# Patient Record
Sex: MALE | Race: BLACK OR AFRICAN AMERICAN | NOT HISPANIC OR LATINO | URBAN - METROPOLITAN AREA
[De-identification: names, ages, dates, MRNs, and addresses within clinical notes are randomized per-mention and may not be internally consistent; named-entity substitution may affect disease eponyms.]

---

## 2018-03-01 ENCOUNTER — OUTPATIENT (OUTPATIENT)
Dept: OUTPATIENT SERVICES | Facility: HOSPITAL | Age: 53
LOS: 1 days | Discharge: HOME | End: 2018-03-01
Payer: COMMERCIAL

## 2018-03-01 DIAGNOSIS — Z13.6 ENCOUNTER FOR SCREENING FOR CARDIOVASCULAR DISORDERS: ICD-10-CM

## 2018-03-01 PROCEDURE — 93010 ELECTROCARDIOGRAM REPORT: CPT

## 2019-01-29 ENCOUNTER — APPOINTMENT (OUTPATIENT)
Dept: INFECTIOUS DISEASE | Facility: CLINIC | Age: 54
End: 2019-01-29

## 2019-01-29 ENCOUNTER — APPOINTMENT (OUTPATIENT)
Dept: INFECTIOUS DISEASE | Facility: CLINIC | Age: 54
End: 2019-01-29
Payer: COMMERCIAL

## 2019-01-29 ENCOUNTER — LABORATORY RESULT (OUTPATIENT)
Age: 54
End: 2019-01-29

## 2019-01-29 VITALS
TEMPERATURE: 97.8 F | SYSTOLIC BLOOD PRESSURE: 156 MMHG | WEIGHT: 160 LBS | HEART RATE: 67 BPM | DIASTOLIC BLOOD PRESSURE: 81 MMHG | OXYGEN SATURATION: 100 %

## 2019-01-29 PROCEDURE — 99204 OFFICE O/P NEW MOD 45 MIN: CPT

## 2019-01-29 RX ORDER — BRIMONIDINE TARTRATE 1.5 MG/ML
0.15 SOLUTION/ DROPS OPHTHALMIC
Refills: 0 | Status: ACTIVE | COMMUNITY

## 2019-01-29 RX ORDER — DORZOLAMIDE HYDROCHLORIDE 20 MG/ML
2 SOLUTION OPHTHALMIC
Refills: 0 | Status: ACTIVE | COMMUNITY

## 2019-01-29 RX ORDER — LATANOPROST/PF 0.005 %
DROPS OPHTHALMIC (EYE)
Refills: 0 | Status: ACTIVE | COMMUNITY

## 2019-01-29 NOTE — PHYSICAL EXAM
[General Appearance - Alert] : alert [General Appearance - In No Acute Distress] : in no acute distress [Sclera] : the sclera and conjunctiva were normal [PERRL With Normal Accommodation] : pupils were equal in size, round, reactive to light [Extraocular Movements] : extraocular movements were intact [Outer Ear] : the ears and nose were normal in appearance [Oropharynx] : the oropharynx was normal with no thrush [Neck Appearance] : the appearance of the neck was normal [Neck Cervical Mass (___cm)] : no neck mass was observed [Jugular Venous Distention Increased] : there was no jugular-venous distention [Thyroid Diffuse Enlargement] : the thyroid was not enlarged [Auscultation Breath Sounds / Voice Sounds] : lungs were clear to auscultation bilaterally [Heart Rate And Rhythm] : heart rate was normal and rhythm regular [Heart Sounds] : normal S1 and S2 [Heart Sounds Gallop] : no gallops [Murmurs] : no murmurs [Heart Sounds Pericardial Friction Rub] : no pericardial rub [Full Pulse] : the pedal pulses are present [Edema] : there was no peripheral edema [Bowel Sounds] : normal bowel sounds [Abdomen Soft] : soft [Abdomen Tenderness] : non-tender [Abdomen Mass (___ Cm)] : no abdominal mass palpated [Costovertebral Angle Tenderness] : no CVA tenderness [Penis Abnormality] : the penis was normal [Scrotum] : the scrotum was normal [Testes Swelling] : the testicles were not swollen [Testes Mass (___cm)] : there were no testicular masses [No Palpable Adenopathy] : no palpable adenopathy [Musculoskeletal - Swelling] : no joint swelling [Nail Clubbing] : no clubbing  or cyanosis of the fingernails [Motor Tone] : muscle strength and tone were normal [Skin Color & Pigmentation] : normal skin color and pigmentation [] : no rash [Deep Tendon Reflexes (DTR)] : deep tendon reflexes were 2+ and symmetric [Sensation] : the sensory exam was normal to light touch and pinprick [No Focal Deficits] : no focal deficits [Oriented To Time, Place, And Person] : oriented to person, place, and time [Affect] : the affect was normal

## 2019-01-29 NOTE — HISTORY OF PRESENT ILLNESS
[FreeTextEntry1] : 53 year old male. Diagnosed with HIV in 1993, from female. Pt was was giving blood at blood [employer]..then went to specialist and told HIV. Then went to see Dr Nohemy Roberts MD internal medicine in [facility] [phone]..she is now retiring? or leaving practice  . He has seen her since 1993 to 2019. PMH: HIV, glaucoma. 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 \par Presently taking- stavudine 40mg  BID, norvir 4 pills BID, Crixivan 400mg 2 tabs BID, nevirapine 200mg  1 pill bid.  for glaucoma, alphagan  daily, dopzolamide HCL daytime, latanoprost night, amoxicillin for dental. \par Parents-father- passed from possible prostate. mother alive- thyroid.\par Pt drink a few beers socially.\par Sleeps only with females. has partner. not . Has one 23 year old male\par Pt works- receiving \par Plan- all labs today\par see in  3 weeks. waiting for genosure archive. \par Pt cell number [phone]\par pt needs colonoscopy. Never had one. \par needs to see rheumatolgy- referral today. \par \par  [Women] : with women [de-identified] : not sure.  [de-identified] : no [de-identified] : receiving  [de-identified] : female [de-identified] : his mother.  [de-identified] : no one knows.

## 2019-01-29 NOTE — ASSESSMENT
[FreeTextEntry1] : 53 year old male. Diagnosed with HIV in 1993, from female. Pt was was giving blood at blood [email or web address]..then went to specialist and told HIV. Then went to see Dr Nohemy Roberts MD internal medicine in [facility] [phone]..she is now retiring? or leaving practice  . He has seen her since 1993 to 2019. PMH: HIV, glaucoma. 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 \par Presently taking- stavudine 40mg  BID, norvir 4 pills BID, Crixivan 400mg 2 tabs BID, nevirapine 200mg  1 pill bid.  for glaucoma, alphagan  daily, dopzolamide HCL daytime, latanoprost night, amoxicillin for dental. \par Parents-father- passed from possible prostate. mother alive- thyroid.\par Pt drink a few beers socially.\par uses [facility] in [place] [facility], on [facility] [address].  [Treatment Education] : treatment education [Treatment Adherence] : treatment adherence [Drug Interactions / Side Effects] : drug interactions/side effects [Disclosure of Diagnosis] : disclosure of diagnosis [HIV Education] : HIV Education [Anticipatory Guidance] : anticipatory guidance

## 2019-01-31 LAB
25(OH)D3 SERPL-MCNC: 24 NG/ML
ALBUMIN SERPL ELPH-MCNC: 4.3 G/DL
ALP BLD-CCNC: 73 U/L
ALT SERPL-CCNC: 15 U/L
ANION GAP SERPL CALC-SCNC: 12 MMOL/L
APPEARANCE: CLEAR
AST SERPL-CCNC: 28 U/L
BACTERIA: NEGATIVE
BASOPHILS # BLD AUTO: 0.04 K/UL
BASOPHILS NFR BLD AUTO: 0.6 %
BILIRUB SERPL-MCNC: 1.8 MG/DL
BILIRUBIN URINE: NEGATIVE
BLOOD URINE: NEGATIVE
BUN SERPL-MCNC: 9 MG/DL
C TRACH RRNA SPEC QL NAA+PROBE: NOT DETECTED
CALCIUM SERPL-MCNC: 9.9 MG/DL
CD3 CELLS # BLD: 1841 /UL
CD3 CELLS NFR BLD: 69 %
CD3+CD4+ CELLS # BLD: 1267 /UL
CD3+CD4+ CELLS NFR BLD: 47 %
CD3+CD4+ CELLS/CD3+CD8+ CLL SPEC: 2.44 RATIO
CD3+CD8+ CELLS # SPEC: 520 /UL
CD3+CD8+ CELLS NFR BLD: 19 %
CHLORIDE SERPL-SCNC: 105 MMOL/L
CHOLEST SERPL-MCNC: 219 MG/DL
CHOLEST/HDLC SERPL: 4 RATIO
CO2 SERPL-SCNC: 22 MMOL/L
COLOR: YELLOW
CREAT SERPL-MCNC: 0.85 MG/DL
EOSINOPHIL # BLD AUTO: 0.21 K/UL
EOSINOPHIL NFR BLD AUTO: 3.4 %
GLUCOSE QUALITATIVE U: NEGATIVE MG/DL
GLUCOSE SERPL-MCNC: 82 MG/DL
HBA1C MFR BLD HPLC: 5.2 %
HBV CORE IGG+IGM SER QL: NONREACTIVE
HBV SURFACE AB SER QL: REACTIVE
HBV SURFACE AG SER QL: NONREACTIVE
HCT VFR BLD CALC: 42.7 %
HCV AB SER QL: NONREACTIVE
HCV S/CO RATIO: 0.15 S/CO
HDLC SERPL-MCNC: 55 MG/DL
HGB BLD-MCNC: 14.5 G/DL
HIV1 RNA # SERPL NAA+PROBE: NORMAL
HIV1 RNA # SERPL NAA+PROBE: NORMAL COPIES/ML
HIV1+2 AB SPEC QL IA.RAPID: REACTIVE
IMM GRANULOCYTES NFR BLD AUTO: 0.2 %
KETONES URINE: NEGATIVE
LDLC SERPL CALC-MCNC: 130 MG/DL
LEUKOCYTE ESTERASE URINE: NEGATIVE
LYMPHOCYTES # BLD AUTO: 3.08 K/UL
LYMPHOCYTES NFR BLD AUTO: 49.5 %
MAN DIFF?: NORMAL
MCHC RBC-ENTMCNC: 34 GM/DL
MCHC RBC-ENTMCNC: 34.2 PG
MCV RBC AUTO: 100.7 FL
MICROSCOPIC-UA: NORMAL
MONOCYTES # BLD AUTO: 0.61 K/UL
MONOCYTES NFR BLD AUTO: 9.8 %
N GONORRHOEA RRNA SPEC QL NAA+PROBE: NOT DETECTED
NEUTROPHILS # BLD AUTO: 2.27 K/UL
NEUTROPHILS NFR BLD AUTO: 36.5 %
NITRITE URINE: NEGATIVE
PH URINE: 6
PLATELET # BLD AUTO: 179 K/UL
POTASSIUM SERPL-SCNC: 4 MMOL/L
PROT SERPL-MCNC: 8.1 G/DL
PROTEIN URINE: ABNORMAL MG/DL
RBC # BLD: 4.24 M/UL
RBC # FLD: 15.1 %
RED BLOOD CELLS URINE: 6 /HPF
SODIUM SERPL-SCNC: 139 MMOL/L
SOURCE AMPLIFICATION: NORMAL
SPECIFIC GRAVITY URINE: 1.02
SQUAMOUS EPITHELIAL CELLS: 1 /HPF
T PALLIDUM AB SER QL IA: NEGATIVE
TRIGL SERPL-MCNC: 172 MG/DL
UROBILINOGEN URINE: NEGATIVE MG/DL
VIRAL LOAD INTERP: NORMAL
VIRAL LOAD LOG: NORMAL LG COP/ML
WBC # FLD AUTO: 6.22 K/UL
WHITE BLOOD CELLS URINE: 1 /HPF

## 2019-02-01 LAB
M TB IFN-G BLD-IMP: NEGATIVE
QUANTIFERON TB PLUS MITOGEN MINUS NIL: 6.64 IU/ML
QUANTIFERON TB PLUS NIL: 0.01 IU/ML
QUANTIFERON TB PLUS TB1 MINUS NIL: 0 IU/ML
QUANTIFERON TB PLUS TB2 MINUS NIL: 0.01 IU/ML

## 2019-02-11 LAB
HIV GENOSURE ARCHIVE 1: NORMAL
HIV1 PROVIR DNA RT + PR + IN MUT DET SEQ: NORMAL
HIV1 PROVIRAL DNA GENTYP BLD MC NAR: NORMAL

## 2019-02-20 ENCOUNTER — APPOINTMENT (OUTPATIENT)
Dept: INFECTIOUS DISEASE | Facility: CLINIC | Age: 54
End: 2019-02-20
Payer: COMMERCIAL

## 2019-02-20 VITALS
OXYGEN SATURATION: 100 % | WEIGHT: 157 LBS | TEMPERATURE: 97.1 F | SYSTOLIC BLOOD PRESSURE: 146 MMHG | DIASTOLIC BLOOD PRESSURE: 80 MMHG | HEART RATE: 76 BPM

## 2019-02-20 PROCEDURE — 99214 OFFICE O/P EST MOD 30 MIN: CPT

## 2019-02-20 NOTE — ASSESSMENT
[FreeTextEntry1] : 2/20/2019----BRENDA LEE is a 53 year old male being seen for a second  evaluation of an existing diagnosis. Plan to change his 14 pill regimen to one pill. Start Biktarvy today. see in 3 weeks. [Treatment Education] : treatment education [Treatment Adherence] : treatment adherence [Drug Interactions / Side Effects] : drug interactions/side effects [HIV Education] : HIV Education [Anticipatory Guidance] : anticipatory guidance

## 2019-02-20 NOTE — HISTORY OF PRESENT ILLNESS
[FreeTextEntry1] : Reason For Visit\par 2/20/2019----BRENDA LEE is a 53 year old male being seen for a second  evaluation of an existing diagnosis. Plan to change his 14 pill regimen to one pill. Start Biktarvy today. see in 3 weeks.\par  \par History of Present Illness\par 53 year old male. Diagnosed with HIV in 1993, from female. Pt was was giving blood at blood [employer]..then went to specialist and told HIV. Then went to see Dr Nohemy Roberts MD internal medicine in [facility] [phone]..she is now retiring? or leaving practice . He has seen her since 1993 to 2019. PMH: HIV, glaucoma. 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 They did not say it was AVN. \par Presently taking- stavudine 40mg BID, norvir 4 pills BID, Crixivan 400mg 2 tabs BID, nevirapine 200mg 1 pill bid. for glaucoma, alphagan daily, dopzolamide HCL daytime, latanoprost night, amoxicillin for dental. \par Parents-father- passed from possible prostate. mother alive- thyroid.\par Pt drink a few beers socially.\par Sleeps only with females. has partner. not . Has one 23 year old male\par Pt works- receiving \par Plan- all labs today\par see in 3 weeks. waiting for genosure archive. \par Pt cell number [phone]\par pt needs colonoscopy. Never had one. \par needs to see rheumatolgy- referral today. \par \par \par The patient has intercourse with women. \par STI, Dates, Treatment: not sure. \par Travel: no. \par Occupation: receiving. \par Partner Status: female. \par Lives with his mother. \par Who is aware of HIV status: no one knows. \par

## 2019-03-31 ENCOUNTER — FORM ENCOUNTER (OUTPATIENT)
Age: 54
End: 2019-03-31

## 2019-04-01 ENCOUNTER — OUTPATIENT (OUTPATIENT)
Dept: OUTPATIENT SERVICES | Facility: HOSPITAL | Age: 54
LOS: 1 days | End: 2019-04-01
Payer: COMMERCIAL

## 2019-04-01 ENCOUNTER — APPOINTMENT (OUTPATIENT)
Dept: RADIOLOGY | Facility: CLINIC | Age: 54
End: 2019-04-01
Payer: COMMERCIAL

## 2019-04-01 ENCOUNTER — APPOINTMENT (OUTPATIENT)
Dept: RHEUMATOLOGY | Facility: CLINIC | Age: 54
End: 2019-04-01
Payer: COMMERCIAL

## 2019-04-01 VITALS
OXYGEN SATURATION: 98 % | BODY MASS INDEX: 27.83 KG/M2 | TEMPERATURE: 98.9 F | HEIGHT: 64 IN | WEIGHT: 163 LBS | HEART RATE: 89 BPM | DIASTOLIC BLOOD PRESSURE: 92 MMHG | SYSTOLIC BLOOD PRESSURE: 174 MMHG

## 2019-04-01 DIAGNOSIS — M06.9 RHEUMATOID ARTHRITIS, UNSPECIFIED: ICD-10-CM

## 2019-04-01 PROCEDURE — 73110 X-RAY EXAM OF WRIST: CPT | Mod: 26,50

## 2019-04-01 PROCEDURE — 73610 X-RAY EXAM OF ANKLE: CPT | Mod: 26,50

## 2019-04-01 PROCEDURE — 99244 OFF/OP CNSLTJ NEW/EST MOD 40: CPT

## 2019-04-01 PROCEDURE — 73110 X-RAY EXAM OF WRIST: CPT

## 2019-04-01 PROCEDURE — 73610 X-RAY EXAM OF ANKLE: CPT

## 2019-04-01 PROCEDURE — 73630 X-RAY EXAM OF FOOT: CPT | Mod: 26,50

## 2019-04-01 PROCEDURE — 73130 X-RAY EXAM OF HAND: CPT | Mod: 26,50

## 2019-04-01 PROCEDURE — 73630 X-RAY EXAM OF FOOT: CPT

## 2019-04-01 PROCEDURE — 73130 X-RAY EXAM OF HAND: CPT

## 2019-04-04 ENCOUNTER — APPOINTMENT (OUTPATIENT)
Dept: INFECTIOUS DISEASE | Facility: CLINIC | Age: 54
End: 2019-04-04
Payer: COMMERCIAL

## 2019-04-04 VITALS
OXYGEN SATURATION: 95 % | DIASTOLIC BLOOD PRESSURE: 88 MMHG | HEART RATE: 83 BPM | SYSTOLIC BLOOD PRESSURE: 159 MMHG | TEMPERATURE: 98.4 F | WEIGHT: 163 LBS | BODY MASS INDEX: 27.98 KG/M2

## 2019-04-04 LAB
25(OH)D3 SERPL-MCNC: 21.3 NG/ML
ALBUMIN SERPL ELPH-MCNC: 4.3 G/DL
ALBUMIN SERPL ELPH-MCNC: 4.3 G/DL
ALP BLD-CCNC: 87 U/L
ALP BLD-CCNC: 90 U/L
ALT SERPL-CCNC: 16 U/L
ALT SERPL-CCNC: 21 U/L
ANION GAP SERPL CALC-SCNC: 12 MMOL/L
ANION GAP SERPL CALC-SCNC: 13 MMOL/L
AST SERPL-CCNC: 23 U/L
AST SERPL-CCNC: 25 U/L
BASOPHILS # BLD AUTO: 0.03 K/UL
BASOPHILS NFR BLD AUTO: 0.4 %
BILIRUB SERPL-MCNC: 0.4 MG/DL
BILIRUB SERPL-MCNC: 0.7 MG/DL
BUN SERPL-MCNC: 10 MG/DL
BUN SERPL-MCNC: 16 MG/DL
CALCIUM SERPL-MCNC: 9.7 MG/DL
CALCIUM SERPL-MCNC: 9.9 MG/DL
CCP AB SER IA-ACNC: 52 UNITS
CD3 CELLS # BLD: 1618 /UL
CD3 CELLS NFR BLD: 65 %
CD3+CD4+ CELLS # BLD: 1023 /UL
CD3+CD4+ CELLS NFR BLD: 41 %
CD3+CD4+ CELLS/CD3+CD8+ CLL SPEC: 1.8 RATIO
CD3+CD8+ CELLS # SPEC: 569 /UL
CD3+CD8+ CELLS NFR BLD: 23 %
CHLORIDE SERPL-SCNC: 103 MMOL/L
CHLORIDE SERPL-SCNC: 104 MMOL/L
CO2 SERPL-SCNC: 24 MMOL/L
CO2 SERPL-SCNC: 25 MMOL/L
CREAT SERPL-MCNC: 0.84 MG/DL
CREAT SERPL-MCNC: 0.94 MG/DL
CRP SERPL-MCNC: 0.47 MG/DL
EOSINOPHIL # BLD AUTO: 0.28 K/UL
EOSINOPHIL NFR BLD AUTO: 3.7 %
ERYTHROCYTE [SEDIMENTATION RATE] IN BLOOD BY WESTERGREN METHOD: 71 MM/HR
GLUCOSE SERPL-MCNC: 103 MG/DL
GLUCOSE SERPL-MCNC: 128 MG/DL
HCT VFR BLD CALC: 45.5 %
HGB BLD-MCNC: 15.1 G/DL
IMM GRANULOCYTES NFR BLD AUTO: 0.1 %
LYMPHOCYTES # BLD AUTO: 2.91 K/UL
LYMPHOCYTES NFR BLD AUTO: 37.9 %
MAN DIFF?: NORMAL
MCHC RBC-ENTMCNC: 30.6 PG
MCHC RBC-ENTMCNC: 33.2 GM/DL
MCV RBC AUTO: 92.1 FL
MONOCYTES # BLD AUTO: 0.68 K/UL
MONOCYTES NFR BLD AUTO: 8.9 %
NEUTROPHILS # BLD AUTO: 3.76 K/UL
NEUTROPHILS NFR BLD AUTO: 49 %
PLATELET # BLD AUTO: 222 K/UL
POTASSIUM SERPL-SCNC: 3.6 MMOL/L
POTASSIUM SERPL-SCNC: 4 MMOL/L
PROT SERPL-MCNC: 7.7 G/DL
PROT SERPL-MCNC: 7.8 G/DL
RBC # BLD: 4.94 M/UL
RBC # FLD: 12.6 %
RF+CCP IGG SER-IMP: ABNORMAL
RHEUMATOID FACT SER QL: >650 IU/ML
SODIUM SERPL-SCNC: 140 MMOL/L
SODIUM SERPL-SCNC: 141 MMOL/L
TSH SERPL-ACNC: 1.05 UIU/ML
WBC # FLD AUTO: 7.67 K/UL

## 2019-04-04 PROCEDURE — 99214 OFFICE O/P EST MOD 30 MIN: CPT

## 2019-04-04 NOTE — ASSESSMENT
[FreeTextEntry1] : 4/4/2019----BRENDA LEE is a 53 year old male being seen for a second evaluation of an existing diagnosis. we changed his 14 pill regimen to one pill,  Biktarvy today. see in 3 months. [Treatment Education] : treatment education [Treatment Adherence] : treatment adherence [Drug Interactions / Side Effects] : drug interactions/side effects [HIV Education] : HIV Education [Anticipatory Guidance] : anticipatory guidance

## 2019-04-04 NOTE — HISTORY OF PRESENT ILLNESS
[FreeTextEntry1] : \par History of Present Illness\par Reason For Visit\par 4/4/2019----BRENDA LEE is a 53 year old male being seen for a second evaluation of an existing diagnosis. we changed his 14 pill regimen to one pill,  Biktarvy today. see in 3 months.\par  \par History of Present Illness\par 53 year old male. Diagnosed with HIV in 1993, from female. Pt was was giving blood at blood [employer]..then went to specialist and told HIV. Then went to see Dr Nohemy Roberts MD internal medicine in [facility] [phone]..she is now retiring? or leaving practice . He has seen her since 1993 to 2019. PMH: HIV, glaucoma. 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 They did not say it was AVN. \par Presently taking- stavudine 40mg BID, norvir 4 pills BID, Crixivan 400mg 2 tabs BID, nevirapine 200mg 1 pill bid. for glaucoma, alphagan daily, dopzolamide HCL daytime, latanoprost night, amoxicillin for dental. \par Parents-father- passed from possible prostate. mother alive- thyroid.\par Pt drink a few beers socially.\par Sleeps only with females. has partner. not . Has one 23 year old male\par Pt works- receiving \par Plan- all labs today\par see in 3 weeks. waiting for genosure archive. \par Pt cell number [phone]\par pt needs colonoscopy. Never had one. \par needs to see rheumatolgy- referral today. \par \par \par The patient has intercourse with women. \par STI, Dates, Treatment: not sure. \par Travel: no. \par Occupation: receiving. \par Partner Status: female. \par Lives with his mother. \par Who is aware of HIV status: no one knows. \par \par  \par Active Problems\par HIV disease (042) (B20)\par Rheumatoid arthritis (714.0) (M06.9)\par \par Past Medical History\par History of dental examination (V15.89) (Z92.89)\par History of Visit for eye and vision exam (V72.0) (Z01.00)\par \par Current Meds\par Biktarvy\par Alphagan P 0.15 % Ophthalmic Solution\par Amoxicillin 500 MG Oral Capsule\par Dorzolamide HCl - 2 % Ophthalmic Solution\par Latanoprost SOLN\par \par

## 2019-04-05 LAB
25(OH)D3 SERPL-MCNC: 22.5 NG/ML
APPEARANCE: CLEAR
BACTERIA: NEGATIVE
BASOPHILS # BLD AUTO: 0.04 K/UL
BASOPHILS NFR BLD AUTO: 0.6 %
BILIRUBIN URINE: NEGATIVE
BLOOD URINE: NEGATIVE
COLOR: NORMAL
EOSINOPHIL # BLD AUTO: 0.25 K/UL
EOSINOPHIL NFR BLD AUTO: 3.6 %
GLUCOSE QUALITATIVE U: NEGATIVE
HCT VFR BLD CALC: 46 %
HGB BLD-MCNC: 15.1 G/DL
HIV1 RNA # SERPL NAA+PROBE: NORMAL
HIV1 RNA # SERPL NAA+PROBE: NORMAL COPIES/ML
HYALINE CASTS: 0 /LPF
IMM GRANULOCYTES NFR BLD AUTO: 0.3 %
KETONES URINE: NEGATIVE
LEUKOCYTE ESTERASE URINE: NEGATIVE
LYMPHOCYTES # BLD AUTO: 2.72 K/UL
LYMPHOCYTES NFR BLD AUTO: 38.7 %
MAN DIFF?: NORMAL
MCHC RBC-ENTMCNC: 30.8 PG
MCHC RBC-ENTMCNC: 32.8 GM/DL
MCV RBC AUTO: 93.7 FL
MICROSCOPIC-UA: NORMAL
MONOCYTES # BLD AUTO: 0.59 K/UL
MONOCYTES NFR BLD AUTO: 8.4 %
NEUTROPHILS # BLD AUTO: 3.4 K/UL
NEUTROPHILS NFR BLD AUTO: 48.4 %
NITRITE URINE: NEGATIVE
PH URINE: 6
PLATELET # BLD AUTO: 209 K/UL
PROTEIN URINE: NEGATIVE
RBC # BLD: 4.91 M/UL
RBC # FLD: 12.6 %
RED BLOOD CELLS URINE: 1 /HPF
SPECIFIC GRAVITY URINE: 1.02
SQUAMOUS EPITHELIAL CELLS: 0 /HPF
UROBILINOGEN URINE: NORMAL
VIRAL LOAD INTERP: NORMAL
VIRAL LOAD LOG: NORMAL LG COP/ML
WBC # FLD AUTO: 7.02 K/UL
WHITE BLOOD CELLS URINE: 1 /HPF

## 2019-07-25 ENCOUNTER — APPOINTMENT (OUTPATIENT)
Dept: INFECTIOUS DISEASE | Facility: CLINIC | Age: 54
End: 2019-07-25
Payer: COMMERCIAL

## 2019-07-25 ENCOUNTER — APPOINTMENT (OUTPATIENT)
Dept: RHEUMATOLOGY | Facility: CLINIC | Age: 54
End: 2019-07-25
Payer: COMMERCIAL

## 2019-07-25 VITALS
TEMPERATURE: 97.5 F | BODY MASS INDEX: 31.36 KG/M2 | HEART RATE: 81 BPM | DIASTOLIC BLOOD PRESSURE: 84 MMHG | SYSTOLIC BLOOD PRESSURE: 162 MMHG | HEIGHT: 63 IN | OXYGEN SATURATION: 97 % | WEIGHT: 177 LBS | RESPIRATION RATE: 16 BRPM

## 2019-07-25 VITALS
HEIGHT: 63 IN | TEMPERATURE: 97.6 F | WEIGHT: 180 LBS | OXYGEN SATURATION: 98 % | BODY MASS INDEX: 31.89 KG/M2 | SYSTOLIC BLOOD PRESSURE: 131 MMHG | HEART RATE: 73 BPM | DIASTOLIC BLOOD PRESSURE: 72 MMHG

## 2019-07-25 DIAGNOSIS — Z00.00 ENCOUNTER FOR GENERAL ADULT MEDICAL EXAMINATION W/OUT ABNORMAL FINDINGS: ICD-10-CM

## 2019-07-25 LAB
BASOPHILS # BLD AUTO: 0.04 K/UL
BASOPHILS NFR BLD AUTO: 0.6 %
EOSINOPHIL # BLD AUTO: 0.45 K/UL
EOSINOPHIL NFR BLD AUTO: 6.5 %
HCT VFR BLD CALC: 45.9 %
HGB BLD-MCNC: 14.8 G/DL
IMM GRANULOCYTES NFR BLD AUTO: 0.3 %
LYMPHOCYTES # BLD AUTO: 3.32 K/UL
LYMPHOCYTES NFR BLD AUTO: 48.2 %
MAN DIFF?: NORMAL
MCHC RBC-ENTMCNC: 27.6 PG
MCHC RBC-ENTMCNC: 32.2 GM/DL
MCV RBC AUTO: 85.5 FL
MONOCYTES # BLD AUTO: 0.51 K/UL
MONOCYTES NFR BLD AUTO: 7.4 %
NEUTROPHILS # BLD AUTO: 2.55 K/UL
NEUTROPHILS NFR BLD AUTO: 37 %
PLATELET # BLD AUTO: 201 K/UL
RBC # BLD: 5.37 M/UL
RBC # FLD: 14.6 %
WBC # FLD AUTO: 6.89 K/UL

## 2019-07-25 PROCEDURE — 99214 OFFICE O/P EST MOD 30 MIN: CPT

## 2019-07-25 PROCEDURE — 99213 OFFICE O/P EST LOW 20 MIN: CPT | Mod: GC

## 2019-07-25 NOTE — HISTORY OF PRESENT ILLNESS
[FreeTextEntry1] : Reason For Visit\par 7/25/2019-----BRENDA LEE is a 53 year old male being seen for a follow-up visit. Continue Biktarvy. All labs today see in 6 months. Vitamin D3 1000 UNIT Oral Capsule; TAKE ONE CAPSULE DAILY AS DIRECTED\par We changed his 14 pill regimen to one pill, Biktarvy . see in 6 months. Pt sees arthritis MD Dr. Encarnacion at [phone]. \par  \par History of Present Illness\par 53 year old male. Diagnosed with HIV in 1993, from female. Pt was was giving blood at [email or web address]..then went to specialist and told HIV. Then went to see Dr Nohemy Roberts MD internal medicine in [facility] [phone]..she is now retiring? or leaving practice . He has seen her since 1993 to 2019. PMH: HIV, glaucoma. 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 They did not say it was AVN. \par was  taking- stavudine 40mg BID, norvir 4 pills BID, Crixivan 400mg 2 tabs BID, nevirapine 200mg 1 pill bid. for glaucoma, alphagan daily, dopzolamide HCL daytime, latanoprost night, amoxicillin for dental. \par Parents-father- passed from possible prostate. mother alive- thyroid.\par Pt drink a few beers socially.\par Sleeps only with females. has partner. not . Has one 23 year old male\par Pt works- receiving \par Plan- all labs today\par see in 3 weeks. waiting for genosure archive. \par Pt cell number [phone]\par pt needs colonoscopy. Never had one. \par needs to see rheumatolgy- referral today. \par \par \par The patient has intercourse with women. \par STI, Dates, Treatment: not sure. \par Travel: no. \par Occupation: receiving. \par Partner Status: female. \par Lives with his mother. \par Who is aware of HIV status: no one knows. \par \par  \par Active Problems\par HIV disease (042) (B20)\par Rheumatoid arthritis (714.0) (M06.9)\par \par Past Medical History\par History of dental examination (V15.89) (Z92.89)\par History of Visit for eye and vision exam (V72.0) (Z01.00)\par \par Current Meds\par Biktarvy\par Alphagan P 0.15 % Ophthalmic Solution\par Amoxicillin 500 MG Oral Capsule\par Dorzolamide HCl - 2 % Ophthalmic Solution\par Latanoprost SOLN\par \par \par  \par Active Problems\par HIV disease (042) (B20)\par Joint pain (719.40) (M25.50)\par Rheumatoid arthritis (714.0) (M06.9)\par Vitamin D deficiency (268.9) (E55.9)\par \par Past Medical History\par History of dental examination (V15.89) (Z92.89)\par History of Visit for eye and vision exam (V72.0) (Z01.00)\par \par Current Meds\par Alphagan P 0.15 % Ophthalmic Solution\par Amoxicillin 500 MG Oral Capsule\par Biktarvy -25 MG Oral Tablet; TAKE ONE TABLET BY MOUTH DAILY\par Crixivan 400 MG Oral Capsule\par Dorzolamide HCl - 2 % Ophthalmic Solution\par Latanoprost SOLN\par Naproxen 500 MG Oral Tablet; TAKE 1 TABLET EVERY 12 HOURS WITH FOOD AS\par NEEDED\par Nevirapine 200 MG Oral Tablet\par Norvir 100 MG Oral Tablet\par Stavudine 40 MG Oral Capsule\par Vitamin D3 1000 UNIT Oral Capsule; TAKE ONE CAPSULE DAILY AS DIRECTED\par

## 2019-07-25 NOTE — ASSESSMENT
[FreeTextEntry1] : 7/25/2019-----BRENDA LEE is a 53 year old male being seen for a follow-up visit. Continue Biktarvy. All labs today see in 6 months. Vitamin D3 1000 UNIT Oral Capsule; TAKE ONE CAPSULE DAILY AS DIRECTED\par We changed his 14 pill regimen to one pill, Biktarvy . see in 6 months. Pt sees arthritis MD Dr. Encarnacion at [phone]. [Treatment Education] : treatment education [Treatment Adherence] : treatment adherence [Drug Interactions / Side Effects] : drug interactions/side effects [HIV Education] : HIV Education [Anticipatory Guidance] : anticipatory guidance

## 2019-07-26 LAB
25(OH)D3 SERPL-MCNC: 28.5 NG/ML
25(OH)D3 SERPL-MCNC: 31 NG/ML
ALBUMIN SERPL ELPH-MCNC: 4.2 G/DL
ALP BLD-CCNC: 73 U/L
ALT SERPL-CCNC: 20 U/L
ANION GAP SERPL CALC-SCNC: 13 MMOL/L
APPEARANCE: CLEAR
AST SERPL-CCNC: 27 U/L
BACTERIA: NEGATIVE
BILIRUB SERPL-MCNC: 0.7 MG/DL
BILIRUBIN URINE: NEGATIVE
BLOOD URINE: NEGATIVE
BUN SERPL-MCNC: 10 MG/DL
C TRACH RRNA SPEC QL NAA+PROBE: NOT DETECTED
C TRACH RRNA SPEC QL NAA+PROBE: NOT DETECTED
CALCIUM SERPL-MCNC: 9.9 MG/DL
CD3 CELLS # BLD: 1855 /UL
CD3 CELLS NFR BLD: 66 %
CD3+CD4+ CELLS # BLD: 1216 /UL
CD3+CD4+ CELLS NFR BLD: 43 %
CD3+CD4+ CELLS/CD3+CD8+ CLL SPEC: 1.98 RATIO
CD3+CD8+ CELLS # SPEC: 613 /UL
CD3+CD8+ CELLS NFR BLD: 22 %
CHLORIDE SERPL-SCNC: 104 MMOL/L
CHOLEST SERPL-MCNC: 194 MG/DL
CHOLEST/HDLC SERPL: 4.4 RATIO
CO2 SERPL-SCNC: 24 MMOL/L
COLOR: NORMAL
CREAT SERPL-MCNC: 0.99 MG/DL
CRP SERPL-MCNC: 0.16 MG/DL
ERYTHROCYTE [SEDIMENTATION RATE] IN BLOOD BY WESTERGREN METHOD: 32 MM/HR
GLUCOSE QUALITATIVE U: NEGATIVE
GLUCOSE SERPL-MCNC: 90 MG/DL
HDLC SERPL-MCNC: 44 MG/DL
HIV1 RNA # SERPL NAA+PROBE: NORMAL
HIV1 RNA # SERPL NAA+PROBE: NORMAL COPIES/ML
HYALINE CASTS: 0 /LPF
KETONES URINE: NEGATIVE
LDLC SERPL CALC-MCNC: 115 MG/DL
LEUKOCYTE ESTERASE URINE: NEGATIVE
MICROSCOPIC-UA: NORMAL
N GONORRHOEA RRNA SPEC QL NAA+PROBE: NOT DETECTED
N GONORRHOEA RRNA SPEC QL NAA+PROBE: NOT DETECTED
NITRITE URINE: NEGATIVE
PH URINE: 6
POTASSIUM SERPL-SCNC: 4.1 MMOL/L
PROT SERPL-MCNC: 7.4 G/DL
PROTEIN URINE: NEGATIVE
PSA SERPL-MCNC: 1.19 NG/ML
RED BLOOD CELLS URINE: 1 /HPF
SODIUM SERPL-SCNC: 141 MMOL/L
SOURCE AMPLIFICATION: NORMAL
SOURCE ORAL: NORMAL
SPECIFIC GRAVITY URINE: 1.02
SQUAMOUS EPITHELIAL CELLS: 0 /HPF
T PALLIDUM AB SER QL IA: NEGATIVE
TRIGL SERPL-MCNC: 176 MG/DL
TSH SERPL-ACNC: 1.71 UIU/ML
UROBILINOGEN URINE: NORMAL
VIRAL LOAD INTERP: NORMAL
VIRAL LOAD LOG: NORMAL LG COP/ML
WHITE BLOOD CELLS URINE: 0 /HPF

## 2019-08-08 ENCOUNTER — APPOINTMENT (OUTPATIENT)
Dept: RHEUMATOLOGY | Facility: CLINIC | Age: 54
End: 2019-08-08
Payer: COMMERCIAL

## 2019-08-08 VITALS
BODY MASS INDEX: 31.89 KG/M2 | OXYGEN SATURATION: 98 % | HEART RATE: 89 BPM | HEIGHT: 63 IN | TEMPERATURE: 98.4 F | DIASTOLIC BLOOD PRESSURE: 77 MMHG | WEIGHT: 180 LBS | SYSTOLIC BLOOD PRESSURE: 156 MMHG

## 2019-08-08 PROCEDURE — 76881 US COMPL JOINT R-T W/IMG: CPT

## 2019-08-14 NOTE — ASSESSMENT
[FreeTextEntry1] : Complete b/l wrist, hands of the US, full findings above\par \par R hand/wrist w mild doppler positive synovitis in mid carpal row, w no erosions in hands or wrist\par \par L hand and wrist significant for 2 possible small erosions, one noted at the dorsal lunate, and one at dorsal MCP 3, both doppler negative (measurements above), as well as mild synovial hypertrophy at the 4th and 5th PIP.

## 2019-08-14 NOTE — REASON FOR VISIT
[Procedure: _________] : a [unfilled] procedure visit [FreeTextEntry1] : Complete b/l hands/wrists to r/o subclinical synovitis and erosions; RF, CCP +, on no meds

## 2019-08-14 NOTE — PROCEDURE
[Today's Date:] : Date: [unfilled] [Diagnostic] : diagnostic  [#1 Site: ______] : #1 site identified in the [unfilled] [#2 Site: ___] : # 2 site identified in the [unfilled] [FreeTextEntry1] : Complete b/l wrist/hands US exam to examine for synovitis/effusions/erosions in patient w +RF, +CCP; not yet on medications\par \par Findings:\par \par R wrist/hand: \par -Small mild synovial hypertrophy at midcarpal row; minimal doppler positive\par -extensor tendons normal in echotexture\par -compartment 1 tendons w normal echotexture\par -ECU intact wout erosions, and normal tendon echotexture\par -volar wrist with suggestion of synovial hypertrophy at the proximal row; doppler negative\par -MCP 2-5 wout erosions or tenosynovitis\par -MCP 4 w mild suggestion of synovial hypertrophy visualized at the palmar view\par -PIP joints w no effusion, erosion or synovial hypertrophy \par \par L wrist/hand: \par -small erosion noted at dorsal aspect of the lunate measuring 0.12 cm long, 0.1 cm deep, on longitudinal view; erosion seen less well on transverse plain, though discontinuation in bony architecture was appreciated and measured 0.12 cm long, 0.06 cm deep; doppler negative\par -extensor, radial compartment 1, and ECU tendons w normal echotexture and no tenosynovitis\par -mild synovial fullness doppler negative at proximal carpal row\par -cortical irregularity at metacarpal side of MCP 3, in dorsal, long view, measuring 0.14 cm X 0.16 cm; doppler negative, not well visualized in transverse view\par -remainder of MCP appeared normal wout erosion or effusion\par -PIP 3-4 w mild synovial hypertrophy \par \par

## 2020-02-05 ENCOUNTER — APPOINTMENT (OUTPATIENT)
Dept: INFECTIOUS DISEASE | Facility: CLINIC | Age: 55
End: 2020-02-05
Payer: COMMERCIAL

## 2020-02-05 VITALS
TEMPERATURE: 98.1 F | HEART RATE: 86 BPM | HEIGHT: 63 IN | BODY MASS INDEX: 33.49 KG/M2 | RESPIRATION RATE: 20 BRPM | DIASTOLIC BLOOD PRESSURE: 89 MMHG | OXYGEN SATURATION: 99 % | SYSTOLIC BLOOD PRESSURE: 159 MMHG | WEIGHT: 189 LBS

## 2020-02-05 DIAGNOSIS — Z12.11 ENCOUNTER FOR SCREENING FOR MALIGNANT NEOPLASM OF COLON: ICD-10-CM

## 2020-02-05 DIAGNOSIS — E55.9 VITAMIN D DEFICIENCY, UNSPECIFIED: ICD-10-CM

## 2020-02-05 PROCEDURE — 99214 OFFICE O/P EST MOD 30 MIN: CPT

## 2020-02-05 NOTE — HISTORY OF PRESENT ILLNESS
[FreeTextEntry1] : \par Reason For Visit\par 2/5/2020-----BRENDA LEE is a 54 year old male being seen for a follow-up visit. Continue Biktarvy. All labs today see in 6 months. Vitamin D3 1000 UNIT Oral Capsule; TAKE ONE CAPSULE DAILY AS DIRECTED\par We changed his 14 pill regimen to one pill, Biktarvy .. Pt sees arthritis MD Dr. Encarnacion at 865., he needs follow up and needs colonoscopy, states no famial Hx of colon ca.  \par  \par History of Present Illness\par 53 year old male. Diagnosed with HIV in 1993, from female. Pt was was giving blood at blood [employer]..then went to specialist and told HIV. Then went to see Dr Nohemy Roberts MD internal medicine in [facility] [phone]..she is now retiring? or leaving practice . He has seen her since 1993 to 2019. PMH: HIV, glaucoma. 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 They did not say it was AVN. \par was taking- stavudine 40mg BID, norvir 4 pills BID, Crixivan 400mg 2 tabs BID, nevirapine 200mg 1 pill bid. for glaucoma, alphagan daily, dopzolamide HCL daytime, latanoprost night, amoxicillin for dental. \par Parents-father- passed from possible prostate. mother alive- thyroid.\par Pt drink a few beers socially.\par Sleeps only with females. has partner. not . Has one 23 year old male\par Pt works- receiving \par Plan- all labs today\par see in 3 weeks. waiting for genosure archive. \par Pt cell number [phone]\par pt needs colonoscopy. Never had one. \par needs to see rheumatolgy- referral today. \par \par \par The patient has intercourse with women. \par STI, Dates, Treatment: not sure. \par Travel: no. \par Occupation: receiving. \par Partner Status: female. \par Lives with his mother. \par Who is aware of HIV status: no one knows. \par \par  \par Active Problems\par HIV disease (042) (B20)\par Rheumatoid arthritis (714.0) (M06.9)\par \par Past Medical History\par History of dental examination (V15.89) (Z92.89)\par History of Visit for eye and vision exam (V72.0) (Z01.00)\par \par Current Meds\par Biktarvy\par Alphagan P 0.15 % Ophthalmic Solution\par Amoxicillin 500 MG Oral Capsule\par Dorzolamide HCl - 2 % Ophthalmic Solution\par Latanoprost SOLN\par \par

## 2020-02-05 NOTE — ASSESSMENT
[FreeTextEntry1] : 2/5/2020-----BRENDA LEE is a 54 year old male being seen for a follow-up visit. Continue Biktarvy. All labs today see in 6 months. Vitamin D3 1000 UNIT Oral Capsule; TAKE ONE CAPSULE DAILY AS DIRECTED\par We changed his 14 pill regimen to one pill, Biktarvy .. Pt sees arthritis MD Dr. Encarnacion at 865., he needs follow up and needs colonoscopy, states no famial Hx of colon ca.  HIV stable.  [Treatment Education] : treatment education [Drug Interactions / Side Effects] : drug interactions/side effects [Treatment Adherence] : treatment adherence [HIV Education] : HIV Education [Anticipatory Guidance] : anticipatory guidance

## 2020-02-06 LAB
25(OH)D3 SERPL-MCNC: 35.3 NG/ML
ALBUMIN SERPL ELPH-MCNC: 4.4 G/DL
ALP BLD-CCNC: 71 U/L
ALT SERPL-CCNC: 26 U/L
ANION GAP SERPL CALC-SCNC: 14 MMOL/L
APPEARANCE: CLEAR
AST SERPL-CCNC: 32 U/L
BACTERIA: NEGATIVE
BASOPHILS # BLD AUTO: 0.05 K/UL
BASOPHILS NFR BLD AUTO: 0.6 %
BILIRUB SERPL-MCNC: 0.6 MG/DL
BILIRUBIN URINE: NEGATIVE
BLOOD URINE: NEGATIVE
BUN SERPL-MCNC: 12 MG/DL
C TRACH RRNA SPEC QL NAA+PROBE: NOT DETECTED
CALCIUM SERPL-MCNC: 9.8 MG/DL
CD3 CELLS # BLD: 1851 /UL
CD3 CELLS NFR BLD: 66 %
CD3+CD4+ CELLS # BLD: 1192 /UL
CD3+CD4+ CELLS NFR BLD: 43 %
CD3+CD4+ CELLS/CD3+CD8+ CLL SPEC: 1.9 RATIO
CD3+CD8+ CELLS # SPEC: 628 /UL
CD3+CD8+ CELLS NFR BLD: 22 %
CHLORIDE SERPL-SCNC: 100 MMOL/L
CHOLEST SERPL-MCNC: 219 MG/DL
CHOLEST/HDLC SERPL: 4.9 RATIO
CO2 SERPL-SCNC: 25 MMOL/L
COLOR: NORMAL
CREAT SERPL-MCNC: 1 MG/DL
EOSINOPHIL # BLD AUTO: 0.27 K/UL
EOSINOPHIL NFR BLD AUTO: 3.4 %
ESTIMATED AVERAGE GLUCOSE: 120 MG/DL
GLUCOSE QUALITATIVE U: NEGATIVE
GLUCOSE SERPL-MCNC: 108 MG/DL
HBA1C MFR BLD HPLC: 5.8 %
HCT VFR BLD CALC: 49.3 %
HDLC SERPL-MCNC: 45 MG/DL
HGB BLD-MCNC: 15.9 G/DL
HIV1 RNA # SERPL NAA+PROBE: NORMAL
HIV1 RNA # SERPL NAA+PROBE: NORMAL COPIES/ML
HYALINE CASTS: 0 /LPF
IMM GRANULOCYTES NFR BLD AUTO: 0.3 %
KETONES URINE: NEGATIVE
LDLC SERPL CALC-MCNC: 130 MG/DL
LEUKOCYTE ESTERASE URINE: NEGATIVE
LYMPHOCYTES # BLD AUTO: 3.06 K/UL
LYMPHOCYTES NFR BLD AUTO: 38.3 %
MAN DIFF?: NORMAL
MCHC RBC-ENTMCNC: 28.5 PG
MCHC RBC-ENTMCNC: 32.3 GM/DL
MCV RBC AUTO: 88.4 FL
MICROSCOPIC-UA: NORMAL
MONOCYTES # BLD AUTO: 0.62 K/UL
MONOCYTES NFR BLD AUTO: 7.8 %
N GONORRHOEA RRNA SPEC QL NAA+PROBE: NOT DETECTED
NEUTROPHILS # BLD AUTO: 3.98 K/UL
NEUTROPHILS NFR BLD AUTO: 49.6 %
NITRITE URINE: NEGATIVE
PH URINE: 5.5
PLATELET # BLD AUTO: 203 K/UL
POTASSIUM SERPL-SCNC: 3.9 MMOL/L
PROT SERPL-MCNC: 7.7 G/DL
PROTEIN URINE: NEGATIVE
RBC # BLD: 5.58 M/UL
RBC # FLD: 13.8 %
RED BLOOD CELLS URINE: 0 /HPF
SODIUM SERPL-SCNC: 139 MMOL/L
SOURCE AMPLIFICATION: NORMAL
SPECIFIC GRAVITY URINE: 1.02
SQUAMOUS EPITHELIAL CELLS: 0 /HPF
T PALLIDUM AB SER QL IA: NEGATIVE
TRIGL SERPL-MCNC: 224 MG/DL
TSH SERPL-ACNC: 1.33 UIU/ML
UROBILINOGEN URINE: NORMAL
VIRAL LOAD INTERP: NORMAL
VIRAL LOAD LOG: NORMAL LG COP/ML
WBC # FLD AUTO: 8 K/UL
WHITE BLOOD CELLS URINE: 0 /HPF

## 2020-03-10 ENCOUNTER — APPOINTMENT (OUTPATIENT)
Dept: RHEUMATOLOGY | Facility: CLINIC | Age: 55
End: 2020-03-10
Payer: COMMERCIAL

## 2020-03-10 VITALS
HEART RATE: 97 BPM | BODY MASS INDEX: 32.78 KG/M2 | DIASTOLIC BLOOD PRESSURE: 81 MMHG | SYSTOLIC BLOOD PRESSURE: 162 MMHG | WEIGHT: 185 LBS | HEIGHT: 63 IN | TEMPERATURE: 98.5 F | OXYGEN SATURATION: 98 %

## 2020-03-10 PROCEDURE — 99214 OFFICE O/P EST MOD 30 MIN: CPT

## 2020-03-21 LAB
ALBUMIN SERPL ELPH-MCNC: 4.4 G/DL
ALP BLD-CCNC: 71 U/L
ALT SERPL-CCNC: 20 U/L
ANION GAP SERPL CALC-SCNC: 19 MMOL/L
AST SERPL-CCNC: 28 U/L
BASOPHILS # BLD AUTO: 0.04 K/UL
BASOPHILS NFR BLD AUTO: 0.5 %
BILIRUB SERPL-MCNC: 0.4 MG/DL
BUN SERPL-MCNC: 10 MG/DL
CALCIUM SERPL-MCNC: 9.7 MG/DL
CHLORIDE SERPL-SCNC: 101 MMOL/L
CO2 SERPL-SCNC: 20 MMOL/L
CREAT SERPL-MCNC: 0.95 MG/DL
CRP SERPL-MCNC: 0.73 MG/DL
EOSINOPHIL # BLD AUTO: 0.17 K/UL
EOSINOPHIL NFR BLD AUTO: 2 %
ERYTHROCYTE [SEDIMENTATION RATE] IN BLOOD BY WESTERGREN METHOD: 44 MM/HR
GLUCOSE SERPL-MCNC: 106 MG/DL
HAV IGM SER QL: NONREACTIVE
HBV CORE IGG+IGM SER QL: NONREACTIVE
HBV CORE IGM SER QL: NONREACTIVE
HBV SURFACE AG SER QL: NONREACTIVE
HCT VFR BLD CALC: 46.6 %
HCV AB SER QL: NONREACTIVE
HCV S/CO RATIO: 0.2 S/CO
HGB BLD-MCNC: 15.2 G/DL
IMM GRANULOCYTES NFR BLD AUTO: 0.2 %
LYMPHOCYTES # BLD AUTO: 3.38 K/UL
LYMPHOCYTES NFR BLD AUTO: 40.4 %
M TB IFN-G BLD-IMP: NEGATIVE
MAN DIFF?: NORMAL
MCHC RBC-ENTMCNC: 28.5 PG
MCHC RBC-ENTMCNC: 32.6 GM/DL
MCV RBC AUTO: 87.3 FL
MONOCYTES # BLD AUTO: 0.82 K/UL
MONOCYTES NFR BLD AUTO: 9.8 %
NEUTROPHILS # BLD AUTO: 3.93 K/UL
NEUTROPHILS NFR BLD AUTO: 47.1 %
PLATELET # BLD AUTO: 192 K/UL
POTASSIUM SERPL-SCNC: 4 MMOL/L
PROT SERPL-MCNC: 7.8 G/DL
QUANTIFERON TB PLUS MITOGEN MINUS NIL: 7.32 IU/ML
QUANTIFERON TB PLUS NIL: 0.01 IU/ML
QUANTIFERON TB PLUS TB1 MINUS NIL: 0 IU/ML
QUANTIFERON TB PLUS TB2 MINUS NIL: 0 IU/ML
RBC # BLD: 5.34 M/UL
RBC # FLD: 13.4 %
SODIUM SERPL-SCNC: 140 MMOL/L
WBC # FLD AUTO: 8.36 K/UL

## 2020-04-07 ENCOUNTER — APPOINTMENT (OUTPATIENT)
Dept: RHEUMATOLOGY | Facility: CLINIC | Age: 55
End: 2020-04-07

## 2020-05-26 ENCOUNTER — RX RENEWAL (OUTPATIENT)
Age: 55
End: 2020-05-26

## 2020-06-11 ENCOUNTER — APPOINTMENT (OUTPATIENT)
Dept: RHEUMATOLOGY | Facility: CLINIC | Age: 55
End: 2020-06-11

## 2020-06-30 ENCOUNTER — APPOINTMENT (OUTPATIENT)
Dept: RHEUMATOLOGY | Facility: CLINIC | Age: 55
End: 2020-06-30

## 2020-06-30 RX ORDER — PREDNISONE 5 MG/1
5 TABLET ORAL
Qty: 30 | Refills: 0 | Status: DISCONTINUED | COMMUNITY
Start: 2020-03-10 | End: 2020-06-30

## 2020-08-03 ENCOUNTER — RX RENEWAL (OUTPATIENT)
Age: 55
End: 2020-08-03

## 2020-08-18 ENCOUNTER — RX RENEWAL (OUTPATIENT)
Age: 55
End: 2020-08-18

## 2020-08-24 ENCOUNTER — FORM ENCOUNTER (OUTPATIENT)
Age: 55
End: 2020-08-24

## 2020-08-25 ENCOUNTER — APPOINTMENT (OUTPATIENT)
Dept: INFECTIOUS DISEASE | Facility: CLINIC | Age: 55
End: 2020-08-25
Payer: COMMERCIAL

## 2020-08-25 VITALS
WEIGHT: 192 LBS | HEART RATE: 94 BPM | TEMPERATURE: 99.2 F | SYSTOLIC BLOOD PRESSURE: 158 MMHG | BODY MASS INDEX: 34.02 KG/M2 | DIASTOLIC BLOOD PRESSURE: 85 MMHG | OXYGEN SATURATION: 98 % | HEIGHT: 63 IN

## 2020-08-25 PROCEDURE — 99214 OFFICE O/P EST MOD 30 MIN: CPT

## 2020-08-25 NOTE — HISTORY OF PRESENT ILLNESS
[FreeTextEntry1] : 8/25/2020-----BRENDA LEE is a 54 year old male being seen for a follow-up visit. Continue Biktarvy. All labs today see in 6 months. Vitamin D3 1000 UNIT Oral Capsule; TAKE ONE CAPSULE DAILY AS DIRECTED\par We changed his 14 pill regimen to one pill, Biktarvy .. Pt sees arthritis MD Dr. Encarnacion at 865., he needs follow up and needs colonoscopy, states no famiy Hx of colon ca. \par plan 8/25/2020: \par follow up with Rheum this thursday. \par all labs today\par see in 6 months\par needs colonoscopy\par needs PCP. \par  \par History of Present Illness\par 53 year old male. Diagnosed with HIV in 1993, from female. Pt was was giving blood at [employer]..then went to specialist and told HIV. Then went to see Dr Nohemy Roberts MD internal medicine in [facility] [phone]..she is now retiring? or leaving practice . He has seen her since 1993 to 2019. PMH: HIV, glaucoma. Surgical HX: Total Hip Replacement left hip from Rhematoid arthritis on 3/15/2018 done at [facility] in [place]. They did not say it was AVN. \par was taking- stavudine 40mg BID, norvir 4 pills BID, Crixivan 400mg 2 tabs BID, nevirapine 200mg 1 pill bid. for glaucoma, alphagan daily, dopzolamide HCL daytime, latanoprost night, amoxicillin for dental. \par Parents-father- passed from possible prostate. mother alive- thyroid.\par Pt drink a few beers socially.\par Sleeps only with females. has partner. not . Has one 23 year old male\par Pt works- receiving \par Plan- all labs today\par see in 3 weeks. waiting for genosure archive. \par Pt cell number [phone]\par pt needs colonoscopy. Never had one. \par needs to see rheumatolgy- referral today. \par \par \par The patient has intercourse with women. \par STI, Dates, Treatment: not sure. \par Travel: no. \par Occupation: receiving. \par Partner Status: female. \par Lives with his mother. \par Who is aware of HIV status: no one knows. \par \par  \par Active Problems\par HIV disease (042) (B20)\par Rheumatoid arthritis (714.0) (M06.9)\par \par Past Medical History\par History of dental examination (V15.89) (Z92.89)\par History of Visit for eye and vision exam (V72.0) (Z01.00)\par \par Current Meds\par Biktarvy\par Alphagan P 0.15 % Ophthalmic Solution\par Amoxicillin 500 MG Oral Capsule\par Dorzolamide HCl - 2 % Ophthalmic Solution\par Latanoprost SOLN\par \par \par  \par Active Problems\par HIV disease (042) (B20)\par Joint pain (719.40) (M25.50)\par Rheumatoid arthritis (714.0) (M06.9)\par Vitamin D deficiency (268.9) (E55.9)\par \par Past Medical History\par History of dental examination (V15.89) (Z92.89)\par History of Visit for eye and vision exam (V72.0) (Z01.00)\par \par Current Meds\par Alphagan P 0.15 % Ophthalmic Solution\par Amoxicillin 500 MG Oral Capsule\par Biktarvy -25 MG Oral Tablet; TAKE ONE TABLET BY MOUTH DAILY\par Crixivan 400 MG Oral Capsule\par Dorzolamide HCl - 2 % Ophthalmic Solution\par Latanoprost SOLN\par Naproxen 500 MG Oral Tablet; TAKE 1 TABLET EVERY 12 HOURS WITH FOOD AS\par NEEDED\par Nevirapine 200 MG Oral Tablet\par Norvir 100 MG Oral Tablet\par Stavudine 40 MG Oral Capsule\par Vitamin D3 25 MCG (1000 UT) Oral Capsule; TAKE ONE CAPSULE DAILY AS DIRECTED\par \par Allergies\par No Known Allergies\par \par Review of Systems\par \par Constitutional, Eyes, ENT, Cardiovascular, Respiratory, Gastrointestinal, Genitourinary, Musculoskeletal, Integumentary, Neurological, Psychiatric and Heme/Lymph are otherwise negative. \par

## 2020-08-25 NOTE — ASSESSMENT
[Treatment Education] : treatment education [Treatment Adherence] : treatment adherence [Drug Interactions / Side Effects] : drug interactions/side effects [HIV Education] : HIV Education [Anticipatory Guidance] : anticipatory guidance

## 2020-08-26 LAB
25(OH)D3 SERPL-MCNC: 33.7 NG/ML
ALBUMIN SERPL ELPH-MCNC: 4.4 G/DL
ALP BLD-CCNC: 65 U/L
ALT SERPL-CCNC: 16 U/L
ANION GAP SERPL CALC-SCNC: 13 MMOL/L
APPEARANCE: CLEAR
AST SERPL-CCNC: 22 U/L
BACTERIA: NEGATIVE
BASOPHILS # BLD AUTO: 0.04 K/UL
BASOPHILS NFR BLD AUTO: 0.5 %
BILIRUB SERPL-MCNC: 0.8 MG/DL
BILIRUBIN URINE: NEGATIVE
BLOOD URINE: NEGATIVE
BUN SERPL-MCNC: 12 MG/DL
CALCIUM SERPL-MCNC: 9.9 MG/DL
CD3 CELLS # BLD: 1978 /UL
CD3 CELLS NFR BLD: 70 %
CD3+CD4+ CELLS # BLD: 1365 /UL
CD3+CD4+ CELLS NFR BLD: 48 %
CD3+CD4+ CELLS/CD3+CD8+ CLL SPEC: 2.32 RATIO
CD3+CD8+ CELLS # SPEC: 589 /UL
CD3+CD8+ CELLS NFR BLD: 21 %
CHLORIDE SERPL-SCNC: 104 MMOL/L
CO2 SERPL-SCNC: 23 MMOL/L
COLOR: YELLOW
CREAT SERPL-MCNC: 0.98 MG/DL
EOSINOPHIL # BLD AUTO: 0.18 K/UL
EOSINOPHIL NFR BLD AUTO: 2.1 %
ESTIMATED AVERAGE GLUCOSE: 120 MG/DL
GLUCOSE QUALITATIVE U: NEGATIVE
GLUCOSE SERPL-MCNC: 123 MG/DL
HBA1C MFR BLD HPLC: 5.8 %
HCT VFR BLD CALC: 46.2 %
HGB BLD-MCNC: 15.1 G/DL
HIV1 RNA # SERPL NAA+PROBE: NORMAL
HIV1 RNA # SERPL NAA+PROBE: NORMAL COPIES/ML
HYALINE CASTS: 0 /LPF
IMM GRANULOCYTES NFR BLD AUTO: 0.3 %
KETONES URINE: NEGATIVE
LEUKOCYTE ESTERASE URINE: NEGATIVE
LYMPHOCYTES # BLD AUTO: 2.94 K/UL
LYMPHOCYTES NFR BLD AUTO: 34.1 %
MAN DIFF?: NORMAL
MCHC RBC-ENTMCNC: 29.6 PG
MCHC RBC-ENTMCNC: 32.7 GM/DL
MCV RBC AUTO: 90.6 FL
MICROSCOPIC-UA: NORMAL
MONOCYTES # BLD AUTO: 0.73 K/UL
MONOCYTES NFR BLD AUTO: 8.5 %
NEUTROPHILS # BLD AUTO: 4.71 K/UL
NEUTROPHILS NFR BLD AUTO: 54.5 %
NITRITE URINE: NEGATIVE
PH URINE: 6
PLATELET # BLD AUTO: 204 K/UL
POTASSIUM SERPL-SCNC: 3.7 MMOL/L
PROT SERPL-MCNC: 7.7 G/DL
PROTEIN URINE: NORMAL
PSA SERPL-MCNC: 1.41 NG/ML
RBC # BLD: 5.1 M/UL
RBC # FLD: 13.9 %
RED BLOOD CELLS URINE: 1 /HPF
SODIUM SERPL-SCNC: 140 MMOL/L
SPECIFIC GRAVITY URINE: 1.03
SQUAMOUS EPITHELIAL CELLS: 0 /HPF
T PALLIDUM AB SER QL IA: NEGATIVE
UROBILINOGEN URINE: NORMAL
VIRAL LOAD INTERP: NORMAL
VIRAL LOAD LOG: NORMAL LG COP/ML
WBC # FLD AUTO: 8.63 K/UL
WHITE BLOOD CELLS URINE: 1 /HPF

## 2020-08-27 LAB
C TRACH RRNA SPEC QL NAA+PROBE: NOT DETECTED
N GONORRHOEA RRNA SPEC QL NAA+PROBE: NOT DETECTED
SOURCE AMPLIFICATION: NORMAL

## 2020-09-24 ENCOUNTER — APPOINTMENT (OUTPATIENT)
Dept: RHEUMATOLOGY | Facility: CLINIC | Age: 55
End: 2020-09-24
Payer: COMMERCIAL

## 2020-09-24 VITALS
TEMPERATURE: 97.7 F | SYSTOLIC BLOOD PRESSURE: 168 MMHG | HEIGHT: 63 IN | BODY MASS INDEX: 32.78 KG/M2 | DIASTOLIC BLOOD PRESSURE: 93 MMHG | WEIGHT: 185 LBS | OXYGEN SATURATION: 98 % | HEART RATE: 90 BPM

## 2020-09-24 PROCEDURE — 99213 OFFICE O/P EST LOW 20 MIN: CPT

## 2020-09-25 LAB
CRP SERPL-MCNC: 0.38 MG/DL
ERYTHROCYTE [SEDIMENTATION RATE] IN BLOOD BY WESTERGREN METHOD: 39 MM/HR

## 2020-10-26 ENCOUNTER — APPOINTMENT (OUTPATIENT)
Dept: RHEUMATOLOGY | Facility: CLINIC | Age: 55
End: 2020-10-26
Payer: COMMERCIAL

## 2020-10-26 VITALS
OXYGEN SATURATION: 98 % | HEART RATE: 82 BPM | HEIGHT: 63 IN | SYSTOLIC BLOOD PRESSURE: 164 MMHG | BODY MASS INDEX: 32.78 KG/M2 | TEMPERATURE: 97.7 F | WEIGHT: 185 LBS | DIASTOLIC BLOOD PRESSURE: 94 MMHG

## 2020-10-26 PROCEDURE — 99214 OFFICE O/P EST MOD 30 MIN: CPT | Mod: 25

## 2020-10-26 PROCEDURE — 99072 ADDL SUPL MATRL&STAF TM PHE: CPT

## 2020-10-27 LAB
ALBUMIN SERPL ELPH-MCNC: 4.5 G/DL
ALP BLD-CCNC: 71 U/L
ALT SERPL-CCNC: 23 U/L
ANION GAP SERPL CALC-SCNC: 12 MMOL/L
AST SERPL-CCNC: 24 U/L
BASOPHILS # BLD AUTO: 0.03 K/UL
BASOPHILS NFR BLD AUTO: 0.4 %
BILIRUB SERPL-MCNC: 0.7 MG/DL
BUN SERPL-MCNC: 8 MG/DL
CALCIUM SERPL-MCNC: 9.6 MG/DL
CHLORIDE SERPL-SCNC: 102 MMOL/L
CO2 SERPL-SCNC: 25 MMOL/L
CREAT SERPL-MCNC: 0.94 MG/DL
CRP SERPL-MCNC: 0.21 MG/DL
EOSINOPHIL # BLD AUTO: 0.28 K/UL
EOSINOPHIL NFR BLD AUTO: 3.8 %
ERYTHROCYTE [SEDIMENTATION RATE] IN BLOOD BY WESTERGREN METHOD: 38 MM/HR
GLUCOSE SERPL-MCNC: 153 MG/DL
HCT VFR BLD CALC: 47.8 %
HGB BLD-MCNC: 15.5 G/DL
IMM GRANULOCYTES NFR BLD AUTO: 0.3 %
LYMPHOCYTES # BLD AUTO: 2.81 K/UL
LYMPHOCYTES NFR BLD AUTO: 38.1 %
MAN DIFF?: NORMAL
MCHC RBC-ENTMCNC: 29.5 PG
MCHC RBC-ENTMCNC: 32.4 GM/DL
MCV RBC AUTO: 90.9 FL
MONOCYTES # BLD AUTO: 0.45 K/UL
MONOCYTES NFR BLD AUTO: 6.1 %
NEUTROPHILS # BLD AUTO: 3.78 K/UL
NEUTROPHILS NFR BLD AUTO: 51.3 %
PLATELET # BLD AUTO: 180 K/UL
POTASSIUM SERPL-SCNC: 4 MMOL/L
PROT SERPL-MCNC: 7.4 G/DL
RBC # BLD: 5.26 M/UL
RBC # FLD: 13.3 %
SODIUM SERPL-SCNC: 139 MMOL/L
WBC # FLD AUTO: 7.37 K/UL

## 2020-12-03 ENCOUNTER — APPOINTMENT (OUTPATIENT)
Dept: RHEUMATOLOGY | Facility: CLINIC | Age: 55
End: 2020-12-03
Payer: COMMERCIAL

## 2020-12-03 VITALS
HEIGHT: 63 IN | OXYGEN SATURATION: 97 % | HEART RATE: 80 BPM | DIASTOLIC BLOOD PRESSURE: 91 MMHG | WEIGHT: 185 LBS | TEMPERATURE: 97.9 F | SYSTOLIC BLOOD PRESSURE: 148 MMHG | BODY MASS INDEX: 32.78 KG/M2 | RESPIRATION RATE: 20 BRPM

## 2020-12-03 PROCEDURE — 99213 OFFICE O/P EST LOW 20 MIN: CPT

## 2020-12-03 PROCEDURE — 99072 ADDL SUPL MATRL&STAF TM PHE: CPT

## 2020-12-08 LAB
ALBUMIN SERPL ELPH-MCNC: 4.3 G/DL
ALP BLD-CCNC: 71 U/L
ALT SERPL-CCNC: 28 U/L
ANION GAP SERPL CALC-SCNC: 14 MMOL/L
AST SERPL-CCNC: 32 U/L
BASOPHILS # BLD AUTO: 0.03 K/UL
BASOPHILS NFR BLD AUTO: 0.5 %
BILIRUB SERPL-MCNC: 0.7 MG/DL
BUN SERPL-MCNC: 11 MG/DL
CALCIUM SERPL-MCNC: 9.4 MG/DL
CHLORIDE SERPL-SCNC: 103 MMOL/L
CO2 SERPL-SCNC: 24 MMOL/L
CREAT SERPL-MCNC: 1 MG/DL
CRP SERPL-MCNC: 0.34 MG/DL
EOSINOPHIL # BLD AUTO: 0.16 K/UL
EOSINOPHIL NFR BLD AUTO: 2.5 %
ERYTHROCYTE [SEDIMENTATION RATE] IN BLOOD BY WESTERGREN METHOD: 34 MM/HR
HCT VFR BLD CALC: 45.7 %
HGB BLD-MCNC: 15 G/DL
IMM GRANULOCYTES NFR BLD AUTO: 0.2 %
LYMPHOCYTES # BLD AUTO: 2.67 K/UL
LYMPHOCYTES NFR BLD AUTO: 42 %
MAN DIFF?: NORMAL
MCHC RBC-ENTMCNC: 30.1 PG
MCHC RBC-ENTMCNC: 32.8 GM/DL
MCV RBC AUTO: 91.6 FL
MONOCYTES # BLD AUTO: 0.46 K/UL
MONOCYTES NFR BLD AUTO: 7.2 %
NEUTROPHILS # BLD AUTO: 3.03 K/UL
NEUTROPHILS NFR BLD AUTO: 47.6 %
PLATELET # BLD AUTO: 190 K/UL
POTASSIUM SERPL-SCNC: 3.7 MMOL/L
PROT SERPL-MCNC: 7.4 G/DL
RBC # BLD: 4.99 M/UL
RBC # FLD: 14.5 %
SODIUM SERPL-SCNC: 141 MMOL/L
WBC # FLD AUTO: 6.36 K/UL

## 2021-02-01 ENCOUNTER — APPOINTMENT (OUTPATIENT)
Dept: RHEUMATOLOGY | Facility: CLINIC | Age: 56
End: 2021-02-01

## 2021-03-09 ENCOUNTER — RX RENEWAL (OUTPATIENT)
Age: 56
End: 2021-03-09

## 2021-03-11 ENCOUNTER — NON-APPOINTMENT (OUTPATIENT)
Age: 56
End: 2021-03-11

## 2021-03-15 ENCOUNTER — APPOINTMENT (OUTPATIENT)
Dept: RHEUMATOLOGY | Facility: CLINIC | Age: 56
End: 2021-03-15
Payer: COMMERCIAL

## 2021-03-15 VITALS
HEART RATE: 83 BPM | TEMPERATURE: 97 F | BODY MASS INDEX: 31.89 KG/M2 | SYSTOLIC BLOOD PRESSURE: 172 MMHG | HEIGHT: 63 IN | DIASTOLIC BLOOD PRESSURE: 94 MMHG | RESPIRATION RATE: 20 BRPM | OXYGEN SATURATION: 98 % | WEIGHT: 180 LBS

## 2021-03-15 DIAGNOSIS — M25.561 PAIN IN RIGHT KNEE: ICD-10-CM

## 2021-03-15 DIAGNOSIS — M25.562 PAIN IN RIGHT KNEE: ICD-10-CM

## 2021-03-15 DIAGNOSIS — Z79.899 OTHER LONG TERM (CURRENT) DRUG THERAPY: ICD-10-CM

## 2021-03-15 PROCEDURE — 99072 ADDL SUPL MATRL&STAF TM PHE: CPT

## 2021-03-15 PROCEDURE — 99214 OFFICE O/P EST MOD 30 MIN: CPT

## 2021-03-16 LAB
ALBUMIN SERPL ELPH-MCNC: 4.2 G/DL
ALP BLD-CCNC: 66 U/L
ALT SERPL-CCNC: 30 U/L
ANION GAP SERPL CALC-SCNC: 14 MMOL/L
AST SERPL-CCNC: 24 U/L
BASOPHILS # BLD AUTO: 0.04 K/UL
BASOPHILS NFR BLD AUTO: 0.7 %
BILIRUB SERPL-MCNC: 0.5 MG/DL
BUN SERPL-MCNC: 9 MG/DL
CALCIUM SERPL-MCNC: 9.7 MG/DL
CHLORIDE SERPL-SCNC: 103 MMOL/L
CO2 SERPL-SCNC: 24 MMOL/L
CREAT SERPL-MCNC: 0.97 MG/DL
CRP SERPL-MCNC: 4 MG/L
EOSINOPHIL # BLD AUTO: 0.21 K/UL
EOSINOPHIL NFR BLD AUTO: 3.8 %
ERYTHROCYTE [SEDIMENTATION RATE] IN BLOOD BY WESTERGREN METHOD: 50 MM/HR
HCT VFR BLD CALC: 49.1 %
HGB BLD-MCNC: 15.6 G/DL
IMM GRANULOCYTES NFR BLD AUTO: 0.4 %
LYMPHOCYTES # BLD AUTO: 2.51 K/UL
LYMPHOCYTES NFR BLD AUTO: 45.3 %
MAN DIFF?: NORMAL
MCHC RBC-ENTMCNC: 29.8 PG
MCHC RBC-ENTMCNC: 31.8 GM/DL
MCV RBC AUTO: 93.9 FL
MONOCYTES # BLD AUTO: 0.53 K/UL
MONOCYTES NFR BLD AUTO: 9.6 %
NEUTROPHILS # BLD AUTO: 2.23 K/UL
NEUTROPHILS NFR BLD AUTO: 40.2 %
PLATELET # BLD AUTO: 189 K/UL
POTASSIUM SERPL-SCNC: 4.3 MMOL/L
PROT SERPL-MCNC: 7.7 G/DL
RBC # BLD: 5.23 M/UL
RBC # FLD: 13.7 %
SODIUM SERPL-SCNC: 140 MMOL/L
WBC # FLD AUTO: 5.54 K/UL

## 2021-04-23 ENCOUNTER — RX RENEWAL (OUTPATIENT)
Age: 56
End: 2021-04-23

## 2021-04-27 ENCOUNTER — RX RENEWAL (OUTPATIENT)
Age: 56
End: 2021-04-27

## 2021-05-17 ENCOUNTER — APPOINTMENT (OUTPATIENT)
Dept: RHEUMATOLOGY | Facility: CLINIC | Age: 56
End: 2021-05-17

## 2021-06-03 ENCOUNTER — RX RENEWAL (OUTPATIENT)
Age: 56
End: 2021-06-03

## 2021-06-29 ENCOUNTER — FORM ENCOUNTER (OUTPATIENT)
Age: 56
End: 2021-06-29

## 2021-06-30 ENCOUNTER — APPOINTMENT (OUTPATIENT)
Dept: INFECTIOUS DISEASE | Facility: CLINIC | Age: 56
End: 2021-06-30
Payer: COMMERCIAL

## 2021-06-30 ENCOUNTER — NON-APPOINTMENT (OUTPATIENT)
Age: 56
End: 2021-06-30

## 2021-06-30 ENCOUNTER — APPOINTMENT (OUTPATIENT)
Dept: INFECTIOUS DISEASE | Facility: CLINIC | Age: 56
End: 2021-06-30

## 2021-06-30 VITALS
SYSTOLIC BLOOD PRESSURE: 187 MMHG | HEART RATE: 82 BPM | OXYGEN SATURATION: 98 % | DIASTOLIC BLOOD PRESSURE: 93 MMHG | BODY MASS INDEX: 35.61 KG/M2 | RESPIRATION RATE: 16 BRPM | WEIGHT: 201 LBS | TEMPERATURE: 98.8 F | HEIGHT: 63 IN

## 2021-06-30 VITALS — DIASTOLIC BLOOD PRESSURE: 83 MMHG | SYSTOLIC BLOOD PRESSURE: 152 MMHG

## 2021-06-30 DIAGNOSIS — R63.5 ABNORMAL WEIGHT GAIN: ICD-10-CM

## 2021-06-30 PROCEDURE — 99213 OFFICE O/P EST LOW 20 MIN: CPT

## 2021-06-30 PROCEDURE — 99072 ADDL SUPL MATRL&STAF TM PHE: CPT

## 2021-06-30 NOTE — HISTORY OF PRESENT ILLNESS
[FreeTextEntry1] : 6/30/21-----BRENDA LEE is a 55 year old male being seen for a follow-up visit. \par Continue Biktarvy. All labs today see in 6 months. Vitamin D3 1000 UNIT Oral Capsule; TAKE ONE CAPSULE DAILY AS DIRECTED\par We changed his 14 pill regimen to one pill, Biktarvy .\par Pt sees arthritis MD Dr. Encarnacion at 865., he needs follow up and needs colonoscopy, states no famiy Hx of colon ca. \par non smoker, occasional drinks\par Pt had Pfizer COVID 19 vaccine both doses. \par plan 6/30/2021: \par Continue to follow up  up with Rheum. \par all labs today\par see in 6 months\par needs screening colonoscopy !\par \par  \par Diagnosed with HIV in 1993, from female. Pt was was giving blood at blood [employer]..then went to specialist and told HIV. Then went to see Dr Nohemy Roberts MD internal medicine in [facility] [phone]..she is now retiring? or leaving practice . He has seen her since 1993 to 2019. PMH: HIV, glaucoma. 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 They did not say it was AVN. \par was taking- stavudine 40mg BID, norvir 4 pills BID, Crixivan 400mg 2 tabs BID, nevirapine 200mg 1 pill bid. for glaucoma, alphagan daily, dopzolamide HCL daytime, latanoprost night, amoxicillin for dental. \par Parents-father- passed from possible prostate. mother alive- thyroid.\par Pt drink a few beers socially.\par Sleeps only with females. has partner. not . Has one 23 year old male\par Pt works- receiving \par Plan- all labs today\par see in 3 weeks. waiting for genosure archive. \par Pt cell number [phone]\par pt needs colonoscopy. Never had one. \par needs to see rheumatolgy- referral today. \par \par \par The patient has intercourse with women. \par STI, Dates, Treatment: not sure. \par Travel: no. \par Occupation: receiving. \par Partner Status: female. \par Lives with his mother. \par Who is aware of HIV status: no one knows. \par \par Current Meds\par Biktarvy\par Alphagan P 0.15 % Ophthalmic Solution\par Amoxicillin 500 MG Oral Capsule\par Dorzolamide HCl - 2 % Ophthalmic Solution\par Latanoprost SOLN\par \par Active Problems\par HIV disease (042) (B20)\par Joint pain (719.40) (M25.50)\par Rheumatoid arthritis (714.0) (M06.9)\par Vitamin D deficiency (268.9) (E55.9)\par \par Past Medical History\par History of dental examination (V15.89) (Z92.89)\par History of Visit for eye and vision exam (V72.0) (Z01.00)\par \par Allergies\par No Known Allergies\par \par \par \par \par \par

## 2021-07-01 LAB
25(OH)D3 SERPL-MCNC: 27.6 NG/ML
ALBUMIN SERPL ELPH-MCNC: 4.5 G/DL
ALP BLD-CCNC: 61 U/L
ALT SERPL-CCNC: 26 U/L
ANION GAP SERPL CALC-SCNC: 11 MMOL/L
APPEARANCE: CLEAR
AST SERPL-CCNC: 28 U/L
BACTERIA: NEGATIVE
BASOPHILS # BLD AUTO: 0.04 K/UL
BASOPHILS NFR BLD AUTO: 0.6 %
BILIRUB SERPL-MCNC: 0.6 MG/DL
BILIRUBIN URINE: NEGATIVE
BLOOD URINE: NEGATIVE
BUN SERPL-MCNC: 11 MG/DL
C TRACH RRNA SPEC QL NAA+PROBE: NOT DETECTED
CALCIUM SERPL-MCNC: 10.2 MG/DL
CD3 CELLS # BLD: 2054 /UL
CD3 CELLS NFR BLD: 69 %
CD3+CD4+ CELLS # BLD: 1451 /UL
CD3+CD4+ CELLS NFR BLD: 49 %
CD3+CD4+ CELLS/CD3+CD8+ CLL SPEC: 2.54 RATIO
CD3+CD8+ CELLS # SPEC: 571 /UL
CD3+CD8+ CELLS NFR BLD: 19 %
CHLORIDE SERPL-SCNC: 106 MMOL/L
CO2 SERPL-SCNC: 24 MMOL/L
COLOR: YELLOW
CREAT SERPL-MCNC: 1.09 MG/DL
EOSINOPHIL # BLD AUTO: 0.18 K/UL
EOSINOPHIL NFR BLD AUTO: 2.6 %
ESTIMATED AVERAGE GLUCOSE: 126 MG/DL
GLUCOSE QUALITATIVE U: NEGATIVE
GLUCOSE SERPL-MCNC: 95 MG/DL
HBA1C MFR BLD HPLC: 6 %
HCT VFR BLD CALC: 46.5 %
HGB BLD-MCNC: 15.5 G/DL
HIV1 RNA # SERPL NAA+PROBE: ABNORMAL
HIV1 RNA # SERPL NAA+PROBE: ABNORMAL COPIES/ML
HYALINE CASTS: 0 /LPF
IMM GRANULOCYTES NFR BLD AUTO: 0.3 %
KETONES URINE: NEGATIVE
LEUKOCYTE ESTERASE URINE: NEGATIVE
LYMPHOCYTES # BLD AUTO: 3.2 K/UL
LYMPHOCYTES NFR BLD AUTO: 45.6 %
MAN DIFF?: NORMAL
MCHC RBC-ENTMCNC: 30.9 PG
MCHC RBC-ENTMCNC: 33.3 GM/DL
MCV RBC AUTO: 92.8 FL
MICROSCOPIC-UA: NORMAL
MONOCYTES # BLD AUTO: 0.52 K/UL
MONOCYTES NFR BLD AUTO: 7.4 %
N GONORRHOEA RRNA SPEC QL NAA+PROBE: NOT DETECTED
NEUTROPHILS # BLD AUTO: 3.06 K/UL
NEUTROPHILS NFR BLD AUTO: 43.5 %
NITRITE URINE: NEGATIVE
PH URINE: 6
PLATELET # BLD AUTO: 175 K/UL
POTASSIUM SERPL-SCNC: 4 MMOL/L
PROT SERPL-MCNC: 7.6 G/DL
PROTEIN URINE: NORMAL
PSA SERPL-MCNC: 1.34 NG/ML
RBC # BLD: 5.01 M/UL
RBC # FLD: 14.6 %
RED BLOOD CELLS URINE: 1 /HPF
SODIUM SERPL-SCNC: 141 MMOL/L
SOURCE AMPLIFICATION: NORMAL
SPECIFIC GRAVITY URINE: 1.02
SQUAMOUS EPITHELIAL CELLS: 0 /HPF
T PALLIDUM AB SER QL IA: NEGATIVE
TSH SERPL-ACNC: 1.31 UIU/ML
UROBILINOGEN URINE: NORMAL
VIRAL LOAD INTERP: NORMAL
VIRAL LOAD LOG: ABNORMAL LG COP/ML
WBC # FLD AUTO: 7.02 K/UL
WHITE BLOOD CELLS URINE: 0 /HPF

## 2021-07-07 ENCOUNTER — APPOINTMENT (OUTPATIENT)
Dept: INFECTIOUS DISEASE | Facility: CLINIC | Age: 56
End: 2021-07-07

## 2021-07-07 ENCOUNTER — NON-APPOINTMENT (OUTPATIENT)
Age: 56
End: 2021-07-07

## 2021-07-15 ENCOUNTER — RX RENEWAL (OUTPATIENT)
Age: 56
End: 2021-07-15

## 2021-07-15 RX ORDER — METHOTREXATE 2.5 MG/1
2.5 TABLET ORAL
Qty: 28 | Refills: 1 | Status: ACTIVE | COMMUNITY
Start: 2020-03-10 | End: 1900-01-01

## 2021-08-03 ENCOUNTER — RX RENEWAL (OUTPATIENT)
Age: 56
End: 2021-08-03

## 2021-08-25 ENCOUNTER — APPOINTMENT (OUTPATIENT)
Dept: INFECTIOUS DISEASE | Facility: CLINIC | Age: 56
End: 2021-08-25
Payer: COMMERCIAL

## 2021-08-25 VITALS
HEART RATE: 89 BPM | BODY MASS INDEX: 35.26 KG/M2 | WEIGHT: 199 LBS | HEIGHT: 63 IN | DIASTOLIC BLOOD PRESSURE: 83 MMHG | TEMPERATURE: 99.2 F | OXYGEN SATURATION: 97 % | SYSTOLIC BLOOD PRESSURE: 198 MMHG

## 2021-08-25 PROCEDURE — 99213 OFFICE O/P EST LOW 20 MIN: CPT

## 2021-08-25 RX ORDER — BLOOD PRESSURE TEST KIT-MEDIUM
KIT MISCELLANEOUS
Qty: 1 | Refills: 0 | Status: ACTIVE | COMMUNITY
Start: 2021-08-25

## 2021-08-25 NOTE — HISTORY OF PRESENT ILLNESS
[FreeTextEntry1] : \par 8/25/21-----BRENDA LEE is a 55 year old male being seen for an HIV follow-up visit. \par Continue Biktarvy. \par All labs today see in 1 month. Vitamin D3 1000 UNIT Oral Capsule; TAKE ONE CAPSULE DAILY AS DIRECTED\par We changed his 14 pill regimen to one pill, Biktarvy .\par Pt sees arthritis MD Dr. Encarnacion at 865., he needs follow up and needs colonoscopy, states no famiy Hx of colon ca. \par non smoker, occasional drinks\par Pt had Pfizer COVID 19 vaccine both doses. \par plan 8/25/2021: \par 1) Biktarvy\par 2) all labs today\par 3) see in 1 month\par 4) needs screening colonoscopy !\par 5) start low dose amlodipine 5mg  today \par  \par Diagnosed with HIV in 1993, from female. Pt was was giving blood at blood [email or web address]..then went to specialist and told HIV. Then went to see Dr Nohemy Roberts MD internal medicine in [facility] [phone]..she is now retiring? or leaving practice . He has seen her since 1993 to 2019. PMH: HIV, glaucoma. 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 in [place]. They did not say it was AVN. \par was taking- stavudine 40mg BID, norvir 4 pills BID, Crixivan 400mg 2 tabs BID, nevirapine 200mg 1 pill bid. for glaucoma, alphagan daily, dopzolamide HCL daytime, latanoprost night, amoxicillin for dental. \par Parents-father- passed from possible prostate. mother alive- thyroid.\par Pt drink a few beers socially.\par Sleeps only with females. has partner. not . Has one 23 year old male\par Pt works- receiving \par Plan- all labs today\par see in 3 weeks. waiting for genosure archive. \par Pt cell number [phone]\par pt needs colonoscopy. Never had one. \par needs to see rheumatolgy- referral today. \par \par \par The patient has intercourse with women. \par STI, Dates, Treatment: not sure. \par Travel: no. \par Occupation: receiving. \par Partner Status: female. \par Lives with his mother. \par Who is aware of HIV status: no one knows. \par \par Current Meds\par Biktarvy\par Alphagan P 0.15 % Ophthalmic Solution\par Amoxicillin 500 MG Oral Capsule\par Dorzolamide HCl - 2 % Ophthalmic Solution\par Latanoprost SOLN\par \par Active Problems\par HIV disease (042) (B20)\par Joint pain (719.40) (M25.50)\par Rheumatoid arthritis (714.0) (M06.9)\par Vitamin D deficiency (268.9) (E55.9)\par \par Past Medical History\par History of dental examination (V15.89) (Z92.89)\par History of Visit for eye and vision exam (V72.0) (Z01.00)\par \par Allergies\par No Known Allergies\par \par \par

## 2021-08-26 LAB
25(OH)D3 SERPL-MCNC: 29.2 NG/ML
ALBUMIN SERPL ELPH-MCNC: 4.5 G/DL
ALP BLD-CCNC: 62 U/L
ALT SERPL-CCNC: 26 U/L
ANION GAP SERPL CALC-SCNC: 11 MMOL/L
APPEARANCE: CLEAR
AST SERPL-CCNC: 26 U/L
BACTERIA: NEGATIVE
BASOPHILS # BLD AUTO: 0.05 K/UL
BASOPHILS NFR BLD AUTO: 0.8 %
BILIRUB SERPL-MCNC: 0.9 MG/DL
BILIRUBIN URINE: NEGATIVE
BLOOD URINE: NEGATIVE
BUN SERPL-MCNC: 13 MG/DL
C TRACH RRNA SPEC QL NAA+PROBE: NOT DETECTED
CALCIUM SERPL-MCNC: 9.6 MG/DL
CD3 CELLS # BLD: 1853 /UL
CD3 CELLS NFR BLD: 71 %
CD3+CD4+ CELLS # BLD: 1321 /UL
CD3+CD4+ CELLS NFR BLD: 50 %
CD3+CD4+ CELLS/CD3+CD8+ CLL SPEC: 2.59 RATIO
CD3+CD8+ CELLS # SPEC: 510 /UL
CD3+CD8+ CELLS NFR BLD: 19 %
CHLORIDE SERPL-SCNC: 105 MMOL/L
CO2 SERPL-SCNC: 24 MMOL/L
COLOR: YELLOW
CREAT SERPL-MCNC: 1.04 MG/DL
EOSINOPHIL # BLD AUTO: 0.19 K/UL
EOSINOPHIL NFR BLD AUTO: 2.9 %
ESTIMATED AVERAGE GLUCOSE: 126 MG/DL
GLUCOSE QUALITATIVE U: NEGATIVE
GLUCOSE SERPL-MCNC: 91 MG/DL
HBA1C MFR BLD HPLC: 6 %
HCT VFR BLD CALC: 45.6 %
HGB BLD-MCNC: 14.9 G/DL
HIV1 RNA # SERPL NAA+PROBE: NORMAL
HIV1 RNA # SERPL NAA+PROBE: NORMAL COPIES/ML
HYALINE CASTS: 0 /LPF
IMM GRANULOCYTES NFR BLD AUTO: 0.2 %
KETONES URINE: NEGATIVE
LEUKOCYTE ESTERASE URINE: NEGATIVE
LYMPHOCYTES # BLD AUTO: 2.83 K/UL
LYMPHOCYTES NFR BLD AUTO: 43.1 %
MAN DIFF?: NORMAL
MCHC RBC-ENTMCNC: 30.4 PG
MCHC RBC-ENTMCNC: 32.7 GM/DL
MCV RBC AUTO: 93.1 FL
MICROSCOPIC-UA: NORMAL
MONOCYTES # BLD AUTO: 0.43 K/UL
MONOCYTES NFR BLD AUTO: 6.5 %
N GONORRHOEA RRNA SPEC QL NAA+PROBE: NOT DETECTED
NEUTROPHILS # BLD AUTO: 3.06 K/UL
NEUTROPHILS NFR BLD AUTO: 46.5 %
NITRITE URINE: NEGATIVE
PH URINE: 6
PLATELET # BLD AUTO: 206 K/UL
POTASSIUM SERPL-SCNC: 3.8 MMOL/L
PROT SERPL-MCNC: 7.4 G/DL
PROTEIN URINE: NEGATIVE
RBC # BLD: 4.9 M/UL
RBC # FLD: 14.1 %
RED BLOOD CELLS URINE: 2 /HPF
SODIUM SERPL-SCNC: 140 MMOL/L
SOURCE AMPLIFICATION: NORMAL
SPECIFIC GRAVITY URINE: 1.03
SQUAMOUS EPITHELIAL CELLS: 0 /HPF
T PALLIDUM AB SER QL IA: NEGATIVE
TSH SERPL-ACNC: 1.39 UIU/ML
UROBILINOGEN URINE: NORMAL
VIRAL LOAD INTERP: NORMAL
VIRAL LOAD LOG: NORMAL LG COP/ML
WBC # FLD AUTO: 6.57 K/UL
WHITE BLOOD CELLS URINE: 1 /HPF

## 2021-08-31 LAB
M TB IFN-G BLD-IMP: NEGATIVE
QUANTIFERON TB PLUS MITOGEN MINUS NIL: 8.36 IU/ML
QUANTIFERON TB PLUS NIL: 0.04 IU/ML
QUANTIFERON TB PLUS TB1 MINUS NIL: -0.01 IU/ML
QUANTIFERON TB PLUS TB2 MINUS NIL: -0.01 IU/ML

## 2021-09-21 ENCOUNTER — FORM ENCOUNTER (OUTPATIENT)
Age: 56
End: 2021-09-21

## 2021-09-22 ENCOUNTER — APPOINTMENT (OUTPATIENT)
Dept: INFECTIOUS DISEASE | Facility: CLINIC | Age: 56
End: 2021-09-22
Payer: COMMERCIAL

## 2021-09-22 VITALS
BODY MASS INDEX: 35.26 KG/M2 | HEIGHT: 63 IN | HEART RATE: 95 BPM | SYSTOLIC BLOOD PRESSURE: 160 MMHG | TEMPERATURE: 98.7 F | WEIGHT: 199 LBS | DIASTOLIC BLOOD PRESSURE: 86 MMHG | RESPIRATION RATE: 16 BRPM | OXYGEN SATURATION: 98 %

## 2021-09-22 VITALS — DIASTOLIC BLOOD PRESSURE: 89 MMHG | SYSTOLIC BLOOD PRESSURE: 160 MMHG

## 2021-09-22 LAB
ALBUMIN SERPL ELPH-MCNC: 4.4 G/DL
ALP BLD-CCNC: 63 U/L
ALT SERPL-CCNC: 27 U/L
ANION GAP SERPL CALC-SCNC: 16 MMOL/L
AST SERPL-CCNC: 30 U/L
BILIRUB SERPL-MCNC: 0.6 MG/DL
BUN SERPL-MCNC: 9 MG/DL
CALCIUM SERPL-MCNC: 9.9 MG/DL
CHLORIDE SERPL-SCNC: 100 MMOL/L
CO2 SERPL-SCNC: 24 MMOL/L
CREAT SERPL-MCNC: 1.03 MG/DL
CYSTATIN C SERPL-MCNC: 0.89 MG/L
GFR/BSA.PRED SERPLBLD CYS-BASED-ARV: 93 ML/MIN
GLUCOSE SERPL-MCNC: 86 MG/DL
POTASSIUM SERPL-SCNC: 3.9 MMOL/L
PROT SERPL-MCNC: 7.5 G/DL
SODIUM SERPL-SCNC: 139 MMOL/L

## 2021-09-22 PROCEDURE — 99213 OFFICE O/P EST LOW 20 MIN: CPT

## 2021-09-22 NOTE — HISTORY OF PRESENT ILLNESS
[FreeTextEntry1] : 9/22/21-----BRENDA LEE is a 55 year old male being seen for an HIV follow-up visit. \par Continue Biktarvy. \par All labs today see in 1 month. Vitamin D3 1000 UNIT Oral Capsule; TAKE ONE CAPSULE DAILY AS DIRECTED\par We changed his 14 pill regimen to one pill, Biktarvy .\par Pt sees arthritis MD Dr. Encarnacion at 865., he needs follow up and needs colonoscopy, states no famiy Hx of colon ca. \par non smoker, occasional drinks\par Pt had Pfizer COVID 19 vaccine both doses. \par plan 8/25/2021: \par 1) Biktarvy\par 2) all labs today\par 3) see in 1 month\par 4) needs screening colonoscopy !\par 5) increase  amlodipine to  10mg today and see Preventive cardiology Dr. Jose Morton. \par  \par Diagnosed with HIV in 1993, from female. Pt was was giving blood at blood [employer]..then went to specialist and told HIV. Then went to see Dr Nohemy Roberts MD internal medicine in [facility] [phone]..she is now retiring? or leaving practice . He has seen her since 1993 to 2019. PMH: HIV, glaucoma. 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 They did not say it was AVN. \par was taking- stavudine 40mg BID, norvir 4 pills BID, Crixivan 400mg 2 tabs BID, nevirapine 200mg 1 pill bid. for glaucoma, alphagan daily, dopzolamide HCL daytime, latanoprost night, amoxicillin for dental. \par Parents-father- passed from possible prostate. mother alive- thyroid.\par Pt drink a few beers socially.\par Sleeps only with females. has partner. not . Has one 23 year old male\par Pt works- receiving \par Plan- all labs today\par see in 3 weeks. waiting for genosure archive. \par Pt cell number [phone]\par pt needs colonoscopy. Never had one. \par needs to see rheumatolgy- referral today. \par \par \par The patient has intercourse with women. \par STI, Dates, Treatment: not sure. \par Travel: no. \par Occupation: receiving. \par Partner Status: female. \par Lives with his mother. \par Who is aware of HIV status: no one knows. \par \par Current Meds\par Biktarvy\par Alphagan P 0.15 % Ophthalmic Solution\par Amoxicillin 500 MG Oral Capsule\par Dorzolamide HCl - 2 % Ophthalmic Solution\par Latanoprost SOLN\par \par Active Problems\par HIV disease (042) (B20)\par Joint pain (719.40) (M25.50)\par Rheumatoid arthritis (714.0) (M06.9)\par Vitamin D deficiency (268.9) (E55.9)\par \par Past Medical History\par History of dental examination (V15.89) (Z92.89)\par History of Visit for eye and vision exam (V72.0) (Z01.00)\par \par Allergies\par No Known Allergies\par \par

## 2021-09-23 ENCOUNTER — RX RENEWAL (OUTPATIENT)
Age: 56
End: 2021-09-23

## 2021-10-11 ENCOUNTER — APPOINTMENT (OUTPATIENT)
Dept: CARDIOLOGY | Facility: CLINIC | Age: 56
End: 2021-10-11
Payer: COMMERCIAL

## 2021-10-11 ENCOUNTER — NON-APPOINTMENT (OUTPATIENT)
Age: 56
End: 2021-10-11

## 2021-10-11 VITALS
WEIGHT: 195 LBS | OXYGEN SATURATION: 98 % | HEART RATE: 90 BPM | RESPIRATION RATE: 16 BRPM | HEIGHT: 63 IN | DIASTOLIC BLOOD PRESSURE: 80 MMHG | SYSTOLIC BLOOD PRESSURE: 150 MMHG | BODY MASS INDEX: 34.55 KG/M2

## 2021-10-11 PROCEDURE — 99245 OFF/OP CONSLTJ NEW/EST HI 55: CPT

## 2021-10-11 PROCEDURE — 93000 ELECTROCARDIOGRAM COMPLETE: CPT

## 2021-10-11 RX ORDER — RITONAVIR 100 MG/1
100 TABLET, FILM COATED ORAL
Refills: 0 | Status: DISCONTINUED | COMMUNITY
End: 2021-10-11

## 2021-10-11 RX ORDER — STAVUDINE 40 MG/1
40 CAPSULE ORAL
Refills: 0 | Status: DISCONTINUED | COMMUNITY
End: 2021-10-11

## 2021-10-11 RX ORDER — NAPROXEN 500 MG/1
500 TABLET ORAL
Qty: 60 | Refills: 1 | Status: DISCONTINUED | COMMUNITY
Start: 2019-04-01 | End: 2021-10-11

## 2021-10-11 RX ORDER — AMOXICILLIN 500 MG/1
500 CAPSULE ORAL
Refills: 0 | Status: DISCONTINUED | COMMUNITY
End: 2021-10-11

## 2021-10-11 RX ORDER — INDINAVIR SULFATE 400 MG/1
400 CAPSULE ORAL
Refills: 0 | Status: DISCONTINUED | COMMUNITY
End: 2021-10-11

## 2021-10-11 RX ORDER — NEVIRAPINE 200 MG/1
200 TABLET ORAL
Refills: 0 | Status: DISCONTINUED | COMMUNITY
End: 2021-10-11

## 2021-10-26 ENCOUNTER — APPOINTMENT (OUTPATIENT)
Dept: INFECTIOUS DISEASE | Facility: CLINIC | Age: 56
End: 2021-10-26
Payer: COMMERCIAL

## 2021-10-26 VITALS
WEIGHT: 200 LBS | OXYGEN SATURATION: 97 % | TEMPERATURE: 98.5 F | DIASTOLIC BLOOD PRESSURE: 93 MMHG | SYSTOLIC BLOOD PRESSURE: 162 MMHG | RESPIRATION RATE: 16 BRPM | HEART RATE: 81 BPM | BODY MASS INDEX: 35.44 KG/M2 | HEIGHT: 63 IN

## 2021-10-26 VITALS — SYSTOLIC BLOOD PRESSURE: 139 MMHG | DIASTOLIC BLOOD PRESSURE: 83 MMHG

## 2021-10-26 LAB
25(OH)D3 SERPL-MCNC: 29.2 NG/ML
ALBUMIN SERPL ELPH-MCNC: 4.4 G/DL
ALP BLD-CCNC: 65 U/L
ALT SERPL-CCNC: 19 U/L
ANION GAP SERPL CALC-SCNC: 14 MMOL/L
AST SERPL-CCNC: 27 U/L
BASOPHILS # BLD AUTO: 0.04 K/UL
BASOPHILS NFR BLD AUTO: 0.5 %
BILIRUB SERPL-MCNC: 0.6 MG/DL
BUN SERPL-MCNC: 18 MG/DL
CALCIUM SERPL-MCNC: 10 MG/DL
CD3 CELLS # BLD: 2156 /UL
CD3 CELLS NFR BLD: 70 %
CD3+CD4+ CELLS # BLD: 1537 /UL
CD3+CD4+ CELLS NFR BLD: 50 %
CD3+CD4+ CELLS/CD3+CD8+ CLL SPEC: 2.65 RATIO
CD3+CD8+ CELLS # SPEC: 580 /UL
CD3+CD8+ CELLS NFR BLD: 19 %
CHLORIDE SERPL-SCNC: 100 MMOL/L
CO2 SERPL-SCNC: 26 MMOL/L
CREAT SERPL-MCNC: 1.25 MG/DL
EOSINOPHIL # BLD AUTO: 0.24 K/UL
EOSINOPHIL NFR BLD AUTO: 3 %
GLUCOSE SERPL-MCNC: 101 MG/DL
HCT VFR BLD CALC: 44.3 %
HGB BLD-MCNC: 15.5 G/DL
IMM GRANULOCYTES NFR BLD AUTO: 0.4 %
LYMPHOCYTES # BLD AUTO: 3.34 K/UL
LYMPHOCYTES NFR BLD AUTO: 42.3 %
MAN DIFF?: NORMAL
MCHC RBC-ENTMCNC: 31.3 PG
MCHC RBC-ENTMCNC: 35 GM/DL
MCV RBC AUTO: 89.3 FL
MONOCYTES # BLD AUTO: 0.72 K/UL
MONOCYTES NFR BLD AUTO: 9.1 %
NEUTROPHILS # BLD AUTO: 3.53 K/UL
NEUTROPHILS NFR BLD AUTO: 44.7 %
PLATELET # BLD AUTO: 216 K/UL
POTASSIUM SERPL-SCNC: 3.6 MMOL/L
PROT SERPL-MCNC: 7.8 G/DL
RBC # BLD: 4.96 M/UL
RBC # FLD: 13 %
SODIUM SERPL-SCNC: 139 MMOL/L
TSH SERPL-ACNC: 1.16 UIU/ML
WBC # FLD AUTO: 7.9 K/UL

## 2021-10-26 PROCEDURE — 99214 OFFICE O/P EST MOD 30 MIN: CPT

## 2021-10-26 NOTE — HISTORY OF PRESENT ILLNESS
[FreeTextEntry1] : 10/26/21-----BRENDA LEE is a 55 year old male being seen for an HIV follow-up visit. \par Continue Biktarvy. \par All labs today see in 6 months. Vitamin D3 1000 UNIT Oral Capsule; TAKE ONE CAPSULE DAILY AS DIRECTED\par We changed his 14 pill regimen to one pill, Biktarvy .\par Pt sees arthritis MD Dr. Encarnacion at 865., he needs follow up and needs colonoscopy, states no famiy Hx of colon ca. \par non smoker, occasional drinks\par Pt had Pfizer COVID 19 vaccine both doses and flu vaccine for 2021. \par plan 10/26/2021: \par 1) Biktarvy\par 2) all labs today\par 3) see in 6 month\par 4) needs screening colonoscopy !\par 5) followed at  Preventive cardiology Dr. Jose Morton. \par 6) needs nutrition consult \par  \par Diagnosed with HIV in 1993, from female. Pt was was giving blood at [employer]..then went to specialist and told HIV. Then went to see Dr Nohemy Roberts MD internal medicine in [facility] [phone]..she is now retiring? or leaving practice . He has seen her since 1993 to 2019. PMH: HIV, glaucoma. 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 They did not say it was AVN. \par was taking- stavudine 40mg BID, norvir 4 pills BID, Crixivan 400mg 2 tabs BID, nevirapine 200mg 1 pill bid. for glaucoma, alphagan daily, dopzolamide HCL daytime, latanoprost night, amoxicillin for dental. \par Parents-father- passed from possible prostate. mother alive- thyroid.\par Pt drink a few beers socially.\par Sleeps only with females. has partner. not . Has one 23 year old male\par Pt works- receiving \par Plan- all labs today\par see in 3 weeks. waiting for genosure archive. \par Pt cell number [phone]\par pt needs colonoscopy. Never had one. \par needs to see rheumatolgy- referral today. \par \par \par The patient has intercourse with women. \par STI, Dates, Treatment: not sure. \par Travel: no. \par Occupation: receiving. \par Partner Status: female. \par Lives with his mother. \par Who is aware of HIV status: no one knows. \par \par Current Meds\par Biktarvy\par Alphagan P 0.15 % Ophthalmic Solution\par Amoxicillin 500 MG Oral Capsule\par Dorzolamide HCl - 2 % Ophthalmic Solution\par Latanoprost SOLN\par \par Active Problems\par HIV disease (042) (B20)\par Joint pain (719.40) (M25.50)\par Rheumatoid arthritis (714.0) (M06.9)\par Vitamin D deficiency (268.9) (E55.9)\par \par Past Medical History\par History of dental examination (V15.89) (Z92.89)\par History of Visit for eye and vision exam (V72.0) (Z01.00)\par \par Allergies\par No Known Allergies\par \par

## 2021-10-27 LAB
APPEARANCE: CLEAR
BACTERIA: NEGATIVE
BILIRUBIN URINE: NEGATIVE
BLOOD URINE: NEGATIVE
COLOR: NORMAL
CYSTATIN C SERPL-MCNC: 0.96 MG/L
ESTIMATED AVERAGE GLUCOSE: 126 MG/DL
GFR/BSA.PRED SERPLBLD CYS-BASED-ARV: 84 ML/MIN
GLUCOSE QUALITATIVE U: NEGATIVE
HBA1C MFR BLD HPLC: 6 %
HIV1 RNA # SERPL NAA+PROBE: NORMAL
HIV1 RNA # SERPL NAA+PROBE: NORMAL COPIES/ML
HYALINE CASTS: 0 /LPF
KETONES URINE: NEGATIVE
LEUKOCYTE ESTERASE URINE: NEGATIVE
MICROSCOPIC-UA: NORMAL
NITRITE URINE: NEGATIVE
PH URINE: 6
PROTEIN URINE: NEGATIVE
RED BLOOD CELLS URINE: 0 /HPF
SPECIFIC GRAVITY URINE: 1.02
SQUAMOUS EPITHELIAL CELLS: 0 /HPF
UROBILINOGEN URINE: NORMAL
VIRAL LOAD INTERP: NORMAL
VIRAL LOAD LOG: NORMAL LG COP/ML
WHITE BLOOD CELLS URINE: 0 /HPF

## 2021-10-29 ENCOUNTER — RX RENEWAL (OUTPATIENT)
Age: 56
End: 2021-10-29

## 2021-10-31 ENCOUNTER — RX RENEWAL (OUTPATIENT)
Age: 56
End: 2021-10-31

## 2021-11-01 ENCOUNTER — FORM ENCOUNTER (OUTPATIENT)
Age: 56
End: 2021-11-01

## 2021-11-02 ENCOUNTER — APPOINTMENT (OUTPATIENT)
Dept: INFECTIOUS DISEASE | Facility: CLINIC | Age: 56
End: 2021-11-02
Payer: COMMERCIAL

## 2021-11-02 PROCEDURE — 97802 MEDICAL NUTRITION INDIV IN: CPT

## 2021-11-10 LAB
ALBUMIN SERPL ELPH-MCNC: 4.2 G/DL
ALDOSTERONE SERUM: 25.5 NG/DL
ALP BLD-CCNC: 61 U/L
ALT SERPL-CCNC: 18 U/L
ANION GAP SERPL CALC-SCNC: 16 MMOL/L
AST SERPL-CCNC: 25 U/L
BASOPHILS # BLD AUTO: 0.04 K/UL
BASOPHILS NFR BLD AUTO: 0.6 %
BILIRUB SERPL-MCNC: 0.8 MG/DL
BUN SERPL-MCNC: 14 MG/DL
CALCIUM SERPL-MCNC: 9.6 MG/DL
CHLORIDE SERPL-SCNC: 100 MMOL/L
CHOLEST SERPL-MCNC: 195 MG/DL
CO2 SERPL-SCNC: 24 MMOL/L
CORTIS SERPL-MCNC: 7.4 UG/DL
CREAT SERPL-MCNC: 1.07 MG/DL
EOSINOPHIL # BLD AUTO: 0.17 K/UL
EOSINOPHIL NFR BLD AUTO: 2.6 %
ESTIMATED AVERAGE GLUCOSE: 126 MG/DL
GLUCOSE SERPL-MCNC: 110 MG/DL
HBA1C MFR BLD HPLC: 6 %
HCT VFR BLD CALC: 46.8 %
HDLC SERPL-MCNC: 43 MG/DL
HGB BLD-MCNC: 15.6 G/DL
IMM GRANULOCYTES NFR BLD AUTO: 0.3 %
LDLC SERPL CALC-MCNC: 135 MG/DL
LYMPHOCYTES # BLD AUTO: 2.63 K/UL
LYMPHOCYTES NFR BLD AUTO: 39.8 %
MAN DIFF?: NORMAL
MCHC RBC-ENTMCNC: 30.2 PG
MCHC RBC-ENTMCNC: 33.3 GM/DL
MCV RBC AUTO: 90.5 FL
MONOCYTES # BLD AUTO: 0.56 K/UL
MONOCYTES NFR BLD AUTO: 8.5 %
NEUTROPHILS # BLD AUTO: 3.19 K/UL
NEUTROPHILS NFR BLD AUTO: 48.2 %
NONHDLC SERPL-MCNC: 152 MG/DL
NT-PROBNP SERPL-MCNC: 14 PG/ML
PLATELET # BLD AUTO: 215 K/UL
POTASSIUM SERPL-SCNC: 3.7 MMOL/L
PROT SERPL-MCNC: 7.8 G/DL
RBC # BLD: 5.17 M/UL
RBC # FLD: 12.8 %
RENIN PLASMA: 41 PG/ML
SODIUM SERPL-SCNC: 139 MMOL/L
TRIGL SERPL-MCNC: 81 MG/DL
TSH SERPL-ACNC: 1.31 UIU/ML
WBC # FLD AUTO: 6.61 K/UL

## 2021-11-15 LAB — RENIN ACTIVITY, PLASMA: 4.75 NG/ML/HR

## 2021-12-14 ENCOUNTER — APPOINTMENT (OUTPATIENT)
Dept: CARDIOLOGY | Facility: CLINIC | Age: 56
End: 2021-12-14

## 2021-12-28 ENCOUNTER — APPOINTMENT (OUTPATIENT)
Dept: CARDIOLOGY | Facility: CLINIC | Age: 56
End: 2021-12-28

## 2021-12-29 ENCOUNTER — APPOINTMENT (OUTPATIENT)
Dept: INFECTIOUS DISEASE | Facility: CLINIC | Age: 56
End: 2021-12-29

## 2022-01-25 ENCOUNTER — APPOINTMENT (OUTPATIENT)
Dept: INFECTIOUS DISEASE | Facility: CLINIC | Age: 57
End: 2022-01-25
Payer: COMMERCIAL

## 2022-01-25 VITALS
BODY MASS INDEX: 35.61 KG/M2 | OXYGEN SATURATION: 99 % | HEIGHT: 63 IN | WEIGHT: 201 LBS | HEART RATE: 87 BPM | SYSTOLIC BLOOD PRESSURE: 163 MMHG | RESPIRATION RATE: 16 BRPM | DIASTOLIC BLOOD PRESSURE: 88 MMHG | TEMPERATURE: 98 F

## 2022-01-25 PROCEDURE — 99214 OFFICE O/P EST MOD 30 MIN: CPT

## 2022-01-25 NOTE — HISTORY OF PRESENT ILLNESS
[FreeTextEntry1] : 1/25/22-----BRENDA LEE is a 55 year old male being seen for an HIV follow-up visit. \par Continue Biktarvy for HIV. \par followed at Preventive cardiology Dr. Jose Morton and taking HCTZ and amlodipine 10mg daily. \par All labs today see in 6 months. Vitamin D3 1000 UNIT Oral Capsule; TAKE ONE CAPSULE DAILY AS DIRECTED\par We changed his 14 pill regimen to one pill, Biktarvy .\par Pt sees arthritis MD Dr. Encarnacion at 865., \par He needs follow up and needs colonoscopy, states no family Hx of colon ca. \par non smoker, occasional drinks\par Pt had Pfizer COVID 19 vaccine both doses and flu vaccine for 2021. \par plan 1/25/2022: \par 1) ContinueBiktarvy\par 2) all labs today\par 3) see in 6 month\par 4) needs screening colonoscopy !\par 5) followed at Preventive cardiology Dr. Jose Morton. \par \par  \par Diagnosed with HIV in 1993, from female. Pt was was giving blood at [employer]..then went to specialist and told HIV. Then went to see Dr Nohemy Roberts MD internal medicine in [facility] [phone]..she is now retiring? or leaving practice . He has seen her since 1993 to 2019. PMH: HIV, glaucoma. Surgical HX: Total Hip Replacement left hip from Rhematoid arthritis on 3/15/2018 done at [facility] in [place]. They did not say it was AVN. \par was taking- stavudine 40mg BID, norvir 4 pills BID, Crixivan 400mg 2 tabs BID, nevirapine 200mg 1 pill bid. for glaucoma, alphagan daily, dopzolamide HCL daytime, latanoprost night, amoxicillin for dental. \par Parents-father- passed from possible prostate. mother alive- thyroid.\par Pt drink a few beers socially.\par Sleeps only with females. has partner. not . Has one 23 year old male\par Pt works- receiving \par Plan- all labs today\par see in 3 weeks. waiting for genosure archive. \par Pt cell number [phone]\par pt needs colonoscopy. Never had one. \par needs to see rheumatolgy- referral today. \par \par \par The patient has intercourse with women. \par STI, Dates, Treatment: not sure. \par Travel: no. \par Occupation: receiving. \par Partner Status: female. \par Lives with his mother. \par Who is aware of HIV status: no one knows. \par \par Current Meds\par Biktarvy\par Alphagan P 0.15 % Ophthalmic Solution\par Amoxicillin 500 MG Oral Capsule\par Dorzolamide HCl - 2 % Ophthalmic Solution\par Latanoprost SOLN\par \par Active Problems\par HIV disease (042) (B20)\par Joint pain (719.40) (M25.50)\par Rheumatoid arthritis (714.0) (M06.9)\par Vitamin D deficiency (268.9) (E55.9)\par \par Past Medical History\par History of dental examination (V15.89) (Z92.89)\par History of Visit for eye and vision exam (V72.0) (Z01.00)\par \par Allergies\par No Known Allergies\par

## 2022-01-26 LAB
25(OH)D3 SERPL-MCNC: 25.4 NG/ML
ALBUMIN SERPL ELPH-MCNC: 4.5 G/DL
ALP BLD-CCNC: 71 U/L
ALT SERPL-CCNC: 14 U/L
ANION GAP SERPL CALC-SCNC: 16 MMOL/L
APPEARANCE: CLEAR
AST SERPL-CCNC: 21 U/L
BACTERIA: NEGATIVE
BASOPHILS # BLD AUTO: 0.04 K/UL
BASOPHILS NFR BLD AUTO: 0.5 %
BILIRUB SERPL-MCNC: 0.7 MG/DL
BILIRUBIN URINE: NEGATIVE
BLOOD URINE: NEGATIVE
BUN SERPL-MCNC: 10 MG/DL
C TRACH RRNA SPEC QL NAA+PROBE: NOT DETECTED
CALCIUM SERPL-MCNC: 9.6 MG/DL
CD3 CELLS # BLD: 2053 /UL
CD3 CELLS NFR BLD: 71 %
CD3+CD4+ CELLS # BLD: 1435 /UL
CD3+CD4+ CELLS NFR BLD: 50 %
CD3+CD4+ CELLS/CD3+CD8+ CLL SPEC: 2.4 RATIO
CD3+CD8+ CELLS # SPEC: 599 /UL
CD3+CD8+ CELLS NFR BLD: 21 %
CHLORIDE SERPL-SCNC: 102 MMOL/L
CO2 SERPL-SCNC: 22 MMOL/L
COLOR: YELLOW
CREAT SERPL-MCNC: 0.98 MG/DL
EOSINOPHIL # BLD AUTO: 0.19 K/UL
EOSINOPHIL NFR BLD AUTO: 2.5 %
ESTIMATED AVERAGE GLUCOSE: 123 MG/DL
GLUCOSE QUALITATIVE U: NEGATIVE
GLUCOSE SERPL-MCNC: 111 MG/DL
HBA1C MFR BLD HPLC: 5.9 %
HCT VFR BLD CALC: 45.8 %
HGB BLD-MCNC: 15.4 G/DL
HIV1 RNA # SERPL NAA+PROBE: NORMAL
HIV1 RNA # SERPL NAA+PROBE: NORMAL COPIES/ML
HYALINE CASTS: 0 /LPF
IMM GRANULOCYTES NFR BLD AUTO: 0.4 %
KETONES URINE: NEGATIVE
LEUKOCYTE ESTERASE URINE: NEGATIVE
LYMPHOCYTES # BLD AUTO: 2.82 K/UL
LYMPHOCYTES NFR BLD AUTO: 36.6 %
MAN DIFF?: NORMAL
MCHC RBC-ENTMCNC: 29.6 PG
MCHC RBC-ENTMCNC: 33.6 GM/DL
MCV RBC AUTO: 87.9 FL
MICROSCOPIC-UA: NORMAL
MONOCYTES # BLD AUTO: 0.53 K/UL
MONOCYTES NFR BLD AUTO: 6.9 %
N GONORRHOEA RRNA SPEC QL NAA+PROBE: NOT DETECTED
NEUTROPHILS # BLD AUTO: 4.1 K/UL
NEUTROPHILS NFR BLD AUTO: 53.1 %
NITRITE URINE: NEGATIVE
PH URINE: 5.5
PLATELET # BLD AUTO: 221 K/UL
POTASSIUM SERPL-SCNC: 4 MMOL/L
PROT SERPL-MCNC: 8 G/DL
PROTEIN URINE: NORMAL
PSA SERPL-MCNC: 1.66 NG/ML
RBC # BLD: 5.21 M/UL
RBC # FLD: 13 %
RED BLOOD CELLS URINE: 1 /HPF
SODIUM SERPL-SCNC: 140 MMOL/L
SOURCE AMPLIFICATION: NORMAL
SPECIFIC GRAVITY URINE: 1.02
SQUAMOUS EPITHELIAL CELLS: 0 /HPF
TSH SERPL-ACNC: 0.92 UIU/ML
UROBILINOGEN URINE: NORMAL
VIRAL LOAD INTERP: NORMAL
VIRAL LOAD LOG: NORMAL LG COP/ML
WBC # FLD AUTO: 7.71 K/UL
WHITE BLOOD CELLS URINE: 1 /HPF

## 2022-01-27 ENCOUNTER — APPOINTMENT (OUTPATIENT)
Dept: CARDIOLOGY | Facility: CLINIC | Age: 57
End: 2022-01-27
Payer: COMMERCIAL

## 2022-01-27 ENCOUNTER — RX RENEWAL (OUTPATIENT)
Age: 57
End: 2022-01-27

## 2022-01-27 VITALS
WEIGHT: 201 LBS | HEART RATE: 74 BPM | BODY MASS INDEX: 35.61 KG/M2 | HEIGHT: 63 IN | SYSTOLIC BLOOD PRESSURE: 142 MMHG | OXYGEN SATURATION: 99 % | DIASTOLIC BLOOD PRESSURE: 78 MMHG

## 2022-01-27 PROCEDURE — 93306 TTE W/DOPPLER COMPLETE: CPT

## 2022-01-27 PROCEDURE — 99402 PREV MED CNSL INDIV APPRX 30: CPT

## 2022-01-27 PROCEDURE — 99215 OFFICE O/P EST HI 40 MIN: CPT | Mod: 25

## 2022-01-27 NOTE — PHYSICAL EXAM
[No Carotid Bruit] : no carotid bruit [Normal] : clear lung fields, good air entry, no respiratory distress [No Edema] : no edema [de-identified] : Dorsocervical hump

## 2022-01-27 NOTE — HISTORY OF PRESENT ILLNESS
[FreeTextEntry1] : Dear Jack, \par \par I had the pleasure of seeing your patient BRENDA LEE for a cardiac consultation.\par \par As you know, He is a pleasant 55 year old male with a past medical history of HIV on Biktarvy, HTN, HL, Obesity/Probable GABRIELLE, A1c 6.0,  \par ===============\par ===============\par 10/2021\par He is here for evaluation for hypertension. He has had HTN for a few years has been on amlodipine for 2 months. Increased to 10mg 2 weeks ago. \par Denies chest pain, dyspnea on exertion, syncope, pre syncope, palpitations, orthopnea, PND. \par Reports snoring loud, wakes up a bit fatigued, has 3x/night nocturia. \par Has not been on his meds for RA for the past two months, due to see rheumatologist. \par Takes aleeve at times for joint pains, but not frequently. \par Never been on statins, has had HLD for years \par \par \par Family Hx: \par No premature ASCVD in the family. Mother - HTN\par \par Social: Never smoked, drinks beer twice a week 10 cans of better. Walks a lot at work. Reports bad diet, lots of fast food - no fruit + vegetable. \par -----------------\par \par Amlodipine 10 mg recently.\par Add Chlorothiadine 25\par Then Crestor 10 mg\par Check Echo\par Check Sleep Study\par  Check 2/2 workup including cortisol\par -----------------\par \par TTE - - LVEF  Nl\par Re-Start HCTZ 25\par Start Atorva 10 \par -----------------\par \par \par \par

## 2022-01-27 NOTE — DISCUSSION/SUMMARY
[FreeTextEntry1] : In summary\par \par Pleasant 55 year old male with a past medical history of HIV on Biktarvy, HTN, HL, Obesity/Probable GABRIELLE, A1c 6.0\par ===============\par TTE - - LVEF  Nl\par ===============\par HIV on Biktarvy,  HL, Obesity/Probable GABRIELLE, A1c 6.0\par - Sleep Apnea testing\par - Crestor 10 mg -> Start\par \par HTN\par - Amlodipine 10\par -  HCTZ 25\par \par \par EKG - for monitoring of heart disease\par \par \par \par Jack, as always, it was a pleasure to care for your patient in my office today.\par \par With kind thanks for the referral.\par \par Jose White MD [facility]\par Director, Preventive Cardiology & Lipidology\par [facility]\par \par \par \par \par \par \par \par \par \par \par \par \par \par \par \par \par \par \par \par \par \par 40 minutes spent in patient encounter explaining and formulating rationale for treatment plan.\par \par \par \par \par \par \par \par \par \par \par \par \par \par \par \par \par \par \par \par \par \par \par \par \par \par \par \par \par \par \par \par \par \par \par \par 30 minutes was provided for preventive counseling on healthy diet, weight maintenance, CV risk reduction.\par Specifically, and separate from other cardiovascular evaluation and treatment, we discussed BRENDA 's willingness to focus  on lifestyle changes, particularly dietary; including greater consumption of vegetables, fruits over saturated fats. We discussed appropriate follow up to monitor progress on these areas. We also discussed the importance of weight control to reduce any exacerbation of underlying conditions.\par

## 2022-02-02 ENCOUNTER — NON-APPOINTMENT (OUTPATIENT)
Age: 57
End: 2022-02-02

## 2022-02-08 ENCOUNTER — APPOINTMENT (OUTPATIENT)
Dept: CARDIOLOGY | Facility: CLINIC | Age: 57
End: 2022-02-08

## 2022-02-22 ENCOUNTER — RX RENEWAL (OUTPATIENT)
Age: 57
End: 2022-02-22

## 2022-04-05 ENCOUNTER — APPOINTMENT (OUTPATIENT)
Dept: INFECTIOUS DISEASE | Facility: CLINIC | Age: 57
End: 2022-04-05

## 2022-04-11 ENCOUNTER — NON-APPOINTMENT (OUTPATIENT)
Age: 57
End: 2022-04-11

## 2022-04-18 ENCOUNTER — FORM ENCOUNTER (OUTPATIENT)
Age: 57
End: 2022-04-18

## 2022-04-19 ENCOUNTER — APPOINTMENT (OUTPATIENT)
Dept: INFECTIOUS DISEASE | Facility: CLINIC | Age: 57
End: 2022-04-19

## 2022-04-22 RX ORDER — FOLIC ACID 1 MG/1
1 TABLET ORAL
Qty: 90 | Refills: 0 | Status: ACTIVE | COMMUNITY
Start: 2020-03-10 | End: 1900-01-01

## 2022-05-03 ENCOUNTER — NON-APPOINTMENT (OUTPATIENT)
Age: 57
End: 2022-05-03

## 2022-05-03 ENCOUNTER — APPOINTMENT (OUTPATIENT)
Dept: INFECTIOUS DISEASE | Facility: CLINIC | Age: 57
End: 2022-05-03

## 2022-07-07 ENCOUNTER — APPOINTMENT (OUTPATIENT)
Dept: CARDIOLOGY | Facility: CLINIC | Age: 57
End: 2022-07-07

## 2022-07-19 ENCOUNTER — APPOINTMENT (OUTPATIENT)
Dept: CARDIOLOGY | Facility: CLINIC | Age: 57
End: 2022-07-19

## 2022-07-19 ENCOUNTER — NON-APPOINTMENT (OUTPATIENT)
Age: 57
End: 2022-07-19

## 2022-07-19 VITALS
WEIGHT: 201 LBS | OXYGEN SATURATION: 98 % | HEIGHT: 63 IN | SYSTOLIC BLOOD PRESSURE: 136 MMHG | DIASTOLIC BLOOD PRESSURE: 70 MMHG | HEART RATE: 85 BPM | BODY MASS INDEX: 35.61 KG/M2

## 2022-07-19 PROCEDURE — 99402 PREV MED CNSL INDIV APPRX 30: CPT

## 2022-07-19 PROCEDURE — 99215 OFFICE O/P EST HI 40 MIN: CPT | Mod: 25

## 2022-07-19 PROCEDURE — 93000 ELECTROCARDIOGRAM COMPLETE: CPT

## 2022-07-20 ENCOUNTER — NON-APPOINTMENT (OUTPATIENT)
Age: 57
End: 2022-07-20

## 2022-08-16 ENCOUNTER — FORM ENCOUNTER (OUTPATIENT)
Age: 57
End: 2022-08-16

## 2022-08-17 ENCOUNTER — APPOINTMENT (OUTPATIENT)
Dept: INFECTIOUS DISEASE | Facility: CLINIC | Age: 57
End: 2022-08-17

## 2022-08-17 VITALS
HEART RATE: 79 BPM | OXYGEN SATURATION: 98 % | WEIGHT: 207 LBS | HEIGHT: 63 IN | DIASTOLIC BLOOD PRESSURE: 89 MMHG | TEMPERATURE: 97.8 F | SYSTOLIC BLOOD PRESSURE: 158 MMHG | BODY MASS INDEX: 36.68 KG/M2

## 2022-08-17 LAB
25(OH)D3 SERPL-MCNC: 31.7 NG/ML
APPEARANCE: CLEAR
BACTERIA: NEGATIVE
BASOPHILS # BLD AUTO: 0.05 K/UL
BASOPHILS NFR BLD AUTO: 0.6 %
BILIRUBIN URINE: NEGATIVE
BLOOD URINE: NEGATIVE
CD3 CELLS # BLD: 2009 /UL
CD3 CELLS NFR BLD: 70 %
CD3+CD4+ CELLS # BLD: 1431 /UL
CD3+CD4+ CELLS NFR BLD: 50 %
CD3+CD4+ CELLS/CD3+CD8+ CLL SPEC: 2.61 RATIO
CD3+CD8+ CELLS # SPEC: 548 /UL
CD3+CD8+ CELLS NFR BLD: 19 %
COLOR: NORMAL
EOSINOPHIL # BLD AUTO: 0.67 K/UL
EOSINOPHIL NFR BLD AUTO: 7.8 %
GLUCOSE QUALITATIVE U: NEGATIVE
HCT VFR BLD CALC: 42.5 %
HGB BLD-MCNC: 14.4 G/DL
HYALINE CASTS: 0 /LPF
IMM GRANULOCYTES NFR BLD AUTO: 0.2 %
KETONES URINE: NEGATIVE
LEUKOCYTE ESTERASE URINE: NEGATIVE
LYMPHOCYTES # BLD AUTO: 2.88 K/UL
LYMPHOCYTES NFR BLD AUTO: 33.5 %
MAN DIFF?: NORMAL
MCHC RBC-ENTMCNC: 28.7 PG
MCHC RBC-ENTMCNC: 33.9 GM/DL
MCV RBC AUTO: 84.7 FL
MICROSCOPIC-UA: NORMAL
MONOCYTES # BLD AUTO: 0.63 K/UL
MONOCYTES NFR BLD AUTO: 7.3 %
NEUTROPHILS # BLD AUTO: 4.35 K/UL
NEUTROPHILS NFR BLD AUTO: 50.6 %
NITRITE URINE: NEGATIVE
PH URINE: 6
PLATELET # BLD AUTO: 224 K/UL
PROTEIN URINE: NEGATIVE
PSA SERPL-MCNC: 1.62 NG/ML
RBC # BLD: 5.02 M/UL
RBC # FLD: 13.8 %
RED BLOOD CELLS URINE: 0 /HPF
SPECIFIC GRAVITY URINE: 1.02
SQUAMOUS EPITHELIAL CELLS: 0 /HPF
TSH SERPL-ACNC: 1.44 UIU/ML
UROBILINOGEN URINE: NORMAL
WBC # FLD AUTO: 8.6 K/UL
WHITE BLOOD CELLS URINE: 1 /HPF

## 2022-08-17 PROCEDURE — 99214 OFFICE O/P EST MOD 30 MIN: CPT

## 2022-08-17 NOTE — HISTORY OF PRESENT ILLNESS
[FreeTextEntry1] : 8/17/22-----BRENDA LEE is a pleasant 56 year old male being seen for an HIV follow-up visit. \par Continue Biktarvy for HIV. May change to Odefsey due to weight gain....concern over 40lbs since 2019...always feels like snacking,\par followed at Preventive cardiology Dr. Jose Morton and taking HCTZ and amlodipine 10mg daily. \par All labs today see in 6 months. Vitamin D3 1000 UNIT Oral Capsule; TAKE ONE CAPSULE DAILY AS DIRECTED\par We changed his 14 pill regimen to one pill, Biktarvy .\par Pt sees arthritis MD Dr. Encarnacion at 865., \par He needs follow up and needs colonoscopy, states no family Hx of colon ca. \par non smoker, occasional drinks\par Pt had Pfizer COVID 19 vaccine both doses and flu vaccine for 2021. \par plan 8/17/22: \par 1) ContinueBiktarvy\par 2) all labs today\par 3) see in 4 month\par 4) needs screening colonoscopy !\par 5) followed at Preventive cardiology Dr. Jose Morton. \par \par  \par Diagnosed with HIV in 1993, from female. Pt was was giving blood at blood [employer]..then went to specialist and told HIV. Then went to see Dr Nohemy Roberts MD internal medicine in [facility] [phone]..she is now retiring? or leaving practice . He has seen her since 1993 to 2019. PMH: HIV, glaucoma. Surgical HX: Total Hip Replacement left hip from Rhematoid arthritis on 3/15/2018 done at [facility] in [place]. They did not say it was AVN. \par was taking- stavudine 40mg BID, norvir 4 pills BID, Crixivan 400mg 2 tabs BID, nevirapine 200mg 1 pill bid. for glaucoma, alphagan daily, dopzolamide HCL daytime, latanoprost night, amoxicillin for dental. \par Parents-father- passed from possible prostate. mother alive- thyroid.\par Pt drink a few beers socially.\par Sleeps only with females. has partner. not . Has one 23 year old male\par Pt works- receiving \par Plan- all labs today\par see in 3 weeks. waiting for genosure archive. \par Pt cell number [phone]\par pt needs colonoscopy. Never had one. \par needs to see rheumatolgy- referral today. \par \par \par The patient has intercourse with women. \par STI, Dates, Treatment: not sure. \par Travel: no. \par Occupation: receiving. \par Partner Status: female. \par Lives with his mother. \par Who is aware of HIV status: no one knows. \par \par Current Meds\par Biktarvy\par Alphagan P 0.15 % Ophthalmic Solution\par Amoxicillin 500 MG Oral Capsule\par Dorzolamide HCl - 2 % Ophthalmic Solution\par Latanoprost SOLN\par \par Active Problems\par HIV disease (042) (B20)\par Joint pain (719.40) (M25.50)\par Rheumatoid arthritis (714.0) (M06.9)\par Vitamin D deficiency (268.9) (E55.9)\par \par Past Medical History\par History of dental examination (V15.89) (Z92.89)\par History of Visit for eye and vision exam (V72.0) (Z01.00)\par \par Allergies\par No Known Allergies\par

## 2022-08-18 LAB
ALBUMIN SERPL ELPH-MCNC: 4.2 G/DL
ALP BLD-CCNC: 66 U/L
ALT SERPL-CCNC: 15 U/L
ANION GAP SERPL CALC-SCNC: 16 MMOL/L
AST SERPL-CCNC: 25 U/L
BILIRUB SERPL-MCNC: 0.6 MG/DL
BUN SERPL-MCNC: 10 MG/DL
CALCIUM SERPL-MCNC: 9.7 MG/DL
CHLORIDE SERPL-SCNC: 100 MMOL/L
CHOLEST SERPL-MCNC: 180 MG/DL
CO2 SERPL-SCNC: 23 MMOL/L
CREAT SERPL-MCNC: 0.95 MG/DL
EGFR: 95 ML/MIN/1.73M2
ESTIMATED AVERAGE GLUCOSE: 131 MG/DL
GLUCOSE SERPL-MCNC: 115 MG/DL
HBA1C MFR BLD HPLC: 6.2 %
HDLC SERPL-MCNC: 42 MG/DL
HIV1 RNA # SERPL NAA+PROBE: NORMAL
HIV1 RNA # SERPL NAA+PROBE: NORMAL COPIES/ML
LDLC SERPL CALC-MCNC: 116 MG/DL
NONHDLC SERPL-MCNC: 138 MG/DL
POTASSIUM SERPL-SCNC: 3.7 MMOL/L
PROT SERPL-MCNC: 7.8 G/DL
RAPID RVP RESULT: NOT DETECTED
SARS-COV-2 RNA PNL RESP NAA+PROBE: NOT DETECTED
SODIUM SERPL-SCNC: 140 MMOL/L
T PALLIDUM AB SER QL IA: NEGATIVE
TRIGL SERPL-MCNC: 111 MG/DL
VIRAL LOAD INTERP: NORMAL
VIRAL LOAD LOG: NORMAL LG COP/ML

## 2022-10-27 ENCOUNTER — RX RENEWAL (OUTPATIENT)
Age: 57
End: 2022-10-27

## 2022-12-13 ENCOUNTER — APPOINTMENT (OUTPATIENT)
Dept: INFECTIOUS DISEASE | Facility: CLINIC | Age: 57
End: 2022-12-13

## 2023-01-10 ENCOUNTER — NON-APPOINTMENT (OUTPATIENT)
Age: 58
End: 2023-01-10

## 2023-01-10 ENCOUNTER — APPOINTMENT (OUTPATIENT)
Dept: CARDIOLOGY | Facility: CLINIC | Age: 58
End: 2023-01-10

## 2023-02-14 ENCOUNTER — APPOINTMENT (OUTPATIENT)
Dept: CARDIOLOGY | Facility: CLINIC | Age: 58
End: 2023-02-14
Payer: COMMERCIAL

## 2023-02-23 ENCOUNTER — APPOINTMENT (OUTPATIENT)
Dept: CARDIOLOGY | Facility: CLINIC | Age: 58
End: 2023-02-23
Payer: COMMERCIAL

## 2023-02-23 ENCOUNTER — NON-APPOINTMENT (OUTPATIENT)
Age: 58
End: 2023-02-23

## 2023-02-23 VITALS
HEART RATE: 75 BPM | WEIGHT: 200 LBS | OXYGEN SATURATION: 98 % | SYSTOLIC BLOOD PRESSURE: 128 MMHG | DIASTOLIC BLOOD PRESSURE: 80 MMHG | BODY MASS INDEX: 35.43 KG/M2

## 2023-02-23 PROCEDURE — 99402 PREV MED CNSL INDIV APPRX 30: CPT

## 2023-02-23 PROCEDURE — 93000 ELECTROCARDIOGRAM COMPLETE: CPT

## 2023-02-23 PROCEDURE — 99215 OFFICE O/P EST HI 40 MIN: CPT | Mod: 25

## 2023-02-23 RX ORDER — AMLODIPINE BESYLATE 10 MG/1
10 TABLET ORAL
Qty: 120 | Refills: 3 | Status: ACTIVE | COMMUNITY
Start: 2021-08-25 | End: 1900-01-01

## 2023-02-23 RX ORDER — ROSUVASTATIN CALCIUM 10 MG/1
10 TABLET, FILM COATED ORAL
Qty: 90 | Refills: 3 | Status: ACTIVE | COMMUNITY
Start: 2022-01-27 | End: 1900-01-01

## 2023-02-23 RX ORDER — SEMAGLUTIDE 1.34 MG/ML
2 INJECTION, SOLUTION SUBCUTANEOUS
Qty: 4 | Refills: 5 | Status: DISCONTINUED | COMMUNITY
Start: 2022-07-19 | End: 2023-02-23

## 2023-03-14 ENCOUNTER — FORM ENCOUNTER (OUTPATIENT)
Age: 58
End: 2023-03-14

## 2023-03-15 ENCOUNTER — APPOINTMENT (OUTPATIENT)
Dept: INFECTIOUS DISEASE | Facility: CLINIC | Age: 58
End: 2023-03-15
Payer: COMMERCIAL

## 2023-03-15 VITALS
HEART RATE: 80 BPM | BODY MASS INDEX: 35.44 KG/M2 | SYSTOLIC BLOOD PRESSURE: 146 MMHG | TEMPERATURE: 97.6 F | WEIGHT: 200 LBS | DIASTOLIC BLOOD PRESSURE: 79 MMHG | OXYGEN SATURATION: 98 % | HEIGHT: 63 IN

## 2023-03-15 PROCEDURE — 99213 OFFICE O/P EST LOW 20 MIN: CPT

## 2023-03-15 NOTE — HISTORY OF PRESENT ILLNESS
[FreeTextEntry1] : 3/15/23-----BRENDA LEE is a pleasant 57 year old male being seen for an HIV follow-up visit. \par Continue Biktarvy for HIV. May change to Odefsey due to weight gain....concern over 40lbs since 2019...always feels like snacking,\par followed at Preventive cardiology Dr. Jose Morton and taking HCTZ and amlodipine 10mg daily. \par All labs today see in 6 months. Vitamin D3 1000 UNIT Oral Capsule; TAKE ONE CAPSULE DAILY AS DIRECTED\par We changed his 14 pill regimen to one pill, Biktarvy .\par Pt sees arthritis MD Dr. Encarnacion at 865., \par He needs follow up and needs colonoscopy, states no family Hx of colon ca. \par non smoker, occasional drinks\par Pt had Pfizer COVID 19 vaccine both doses and flu vaccine for 2021. \par \par plan 3/15/23: \par 1) Continue Biktarvy\par 2) all labs today\par 3) see in 6 month\par 4) needs screening colonoscopy !\par 5) followed at Preventive cardiology Dr. Jose Morton. \par \par  \par Diagnosed with HIV in 1993, from female. Pt was was giving blood at blood [employer]..then went to specialist and told HIV. Then went to see Dr Nohemy Roberts MD internal medicine in [facility] [phone]..she is now retiring? or leaving practice . He has seen her since 1993 to 2019. PMH: HIV, glaucoma. Surgical HX: Total Hip Replacement left hip from Rhematoid arthritis on 3/15/2018 done at [facility]. They did not say it was AVN. \par was taking- stavudine 40mg BID, norvir 4 pills BID, Crixivan 400mg 2 tabs BID, nevirapine 200mg 1 pill bid. for glaucoma, alphagan daily, dopzolamide HCL daytime, latanoprost night, amoxicillin for dental. \par Parents-father- passed from possible prostate. mother alive- thyroid.\par Pt drink a few beers socially.\par Sleeps only with females. has partner. not . Has one 23 year old male\par Pt works- receiving \par Plan- all labs today\par see in 3 weeks. waiting for genosure archive. \par Pt cell number [phone]\par pt needs colonoscopy. Never had one. \par needs to see rheumatolgy- referral today. \par \par \par The patient has intercourse with women. \par STI, Dates, Treatment: not sure. \par Travel: no. \par Occupation: receiving. \par Partner Status: female. \par Lives with his mother. \par Who is aware of HIV status: no one knows. \par \par Current Meds\par Biktarvy\par Alphagan P 0.15 % Ophthalmic Solution\par Amoxicillin 500 MG Oral Capsule\par Dorzolamide HCl - 2 % Ophthalmic Solution\par Latanoprost SOLN\par \par Active Problems\par HIV disease (042) (B20)\par Joint pain (719.40) (M25.50)\par Rheumatoid arthritis (714.0) (M06.9)\par Vitamin D deficiency (268.9) (E55.9)\par \par Past Medical History\par History of dental examination (V15.89) (Z92.89)\par History of Visit for eye and vision exam (V72.0) (Z01.00)\par \par Allergies\par No Known Allergies\par

## 2023-03-16 LAB
25(OH)D3 SERPL-MCNC: 28.3 NG/ML
ALBUMIN SERPL ELPH-MCNC: 4.4 G/DL
ALP BLD-CCNC: 81 U/L
ALT SERPL-CCNC: 15 U/L
ANION GAP SERPL CALC-SCNC: 16 MMOL/L
APPEARANCE: CLEAR
AST SERPL-CCNC: 23 U/L
BACTERIA: NEGATIVE
BASOPHILS # BLD AUTO: 0.06 K/UL
BASOPHILS NFR BLD AUTO: 0.7 %
BILIRUB SERPL-MCNC: 0.4 MG/DL
BILIRUBIN URINE: NEGATIVE
BLOOD URINE: NEGATIVE
BUN SERPL-MCNC: 13 MG/DL
CALCIUM SERPL-MCNC: 10 MG/DL
CD3 CELLS # BLD: 1778 CELLS/UL
CD3 CELLS NFR BLD: 70 %
CD3+CD4+ CELLS # BLD: 1338 CELLS/UL
CD3+CD4+ CELLS NFR BLD: 53 %
CD3+CD4+ CELLS/CD3+CD8+ CLL SPEC: 3.22 RATIO
CD3+CD8+ CELLS # SPEC: 416 CELLS/UL
CD3+CD8+ CELLS NFR BLD: 16 %
CHLORIDE SERPL-SCNC: 100 MMOL/L
CHOLEST SERPL-MCNC: 207 MG/DL
CO2 SERPL-SCNC: 23 MMOL/L
COLOR: NORMAL
CREAT SERPL-MCNC: 0.99 MG/DL
EGFR: 90 ML/MIN/1.73M2
EOSINOPHIL # BLD AUTO: 0.3 K/UL
EOSINOPHIL NFR BLD AUTO: 3.7 %
ESTIMATED AVERAGE GLUCOSE: 126 MG/DL
GLUCOSE QUALITATIVE U: NEGATIVE
GLUCOSE SERPL-MCNC: 105 MG/DL
HBA1C MFR BLD HPLC: 6 %
HCT VFR BLD CALC: 45.6 %
HDLC SERPL-MCNC: 46 MG/DL
HGB BLD-MCNC: 15.1 G/DL
HIV1 RNA # SERPL NAA+PROBE: NORMAL
HIV1 RNA # SERPL NAA+PROBE: NORMAL COPIES/ML
HYALINE CASTS: 0 /LPF
IMM GRANULOCYTES NFR BLD AUTO: 0.1 %
KETONES URINE: NEGATIVE
LDLC SERPL CALC-MCNC: 132 MG/DL
LEUKOCYTE ESTERASE URINE: NEGATIVE
LYMPHOCYTES # BLD AUTO: 3.14 K/UL
LYMPHOCYTES NFR BLD AUTO: 38.6 %
MAN DIFF?: NORMAL
MCHC RBC-ENTMCNC: 28.9 PG
MCHC RBC-ENTMCNC: 33.1 GM/DL
MCV RBC AUTO: 87.4 FL
MICROSCOPIC-UA: NORMAL
MONOCYTES # BLD AUTO: 0.64 K/UL
MONOCYTES NFR BLD AUTO: 7.9 %
NEUTROPHILS # BLD AUTO: 3.98 K/UL
NEUTROPHILS NFR BLD AUTO: 49 %
NITRITE URINE: NEGATIVE
NONHDLC SERPL-MCNC: 161 MG/DL
PH URINE: 6
PLATELET # BLD AUTO: 217 K/UL
POTASSIUM SERPL-SCNC: 3.5 MMOL/L
PROT SERPL-MCNC: 8.2 G/DL
PROTEIN URINE: NORMAL
PSA SERPL-MCNC: 1.53 NG/ML
RBC # BLD: 5.22 M/UL
RBC # FLD: 13.5 %
RED BLOOD CELLS URINE: 1 /HPF
SODIUM SERPL-SCNC: 140 MMOL/L
SPECIFIC GRAVITY URINE: 1.03
SQUAMOUS EPITHELIAL CELLS: 0 /HPF
T PALLIDUM AB SER QL IA: NEGATIVE
TRIGL SERPL-MCNC: 146 MG/DL
TSH SERPL-ACNC: 1.21 UIU/ML
UROBILINOGEN URINE: NORMAL
VIRAL LOAD INTERP: NORMAL
VIRAL LOAD LOG: NORMAL LG COP/ML
WBC # FLD AUTO: 8.13 K/UL
WHITE BLOOD CELLS URINE: 1 /HPF

## 2023-07-06 ENCOUNTER — APPOINTMENT (OUTPATIENT)
Dept: CARDIOLOGY | Facility: CLINIC | Age: 58
End: 2023-07-06

## 2023-07-11 ENCOUNTER — APPOINTMENT (OUTPATIENT)
Dept: CARDIOLOGY | Facility: CLINIC | Age: 58
End: 2023-07-11

## 2023-07-18 ENCOUNTER — APPOINTMENT (OUTPATIENT)
Dept: CARDIOLOGY | Facility: CLINIC | Age: 58
End: 2023-07-18
Payer: COMMERCIAL

## 2023-07-18 VITALS
WEIGHT: 200 LBS | SYSTOLIC BLOOD PRESSURE: 152 MMHG | OXYGEN SATURATION: 97 % | HEART RATE: 89 BPM | HEIGHT: 63 IN | BODY MASS INDEX: 35.44 KG/M2 | DIASTOLIC BLOOD PRESSURE: 80 MMHG

## 2023-07-18 PROCEDURE — 99215 OFFICE O/P EST HI 40 MIN: CPT

## 2023-07-18 RX ORDER — LISINOPRIL 10 MG/1
10 TABLET ORAL
Qty: 90 | Refills: 3 | Status: ACTIVE | COMMUNITY
Start: 2023-07-18 | End: 1900-01-01

## 2023-09-05 ENCOUNTER — APPOINTMENT (OUTPATIENT)
Dept: INFECTIOUS DISEASE | Facility: CLINIC | Age: 58
End: 2023-09-05

## 2023-09-12 ENCOUNTER — RX RENEWAL (OUTPATIENT)
Age: 58
End: 2023-09-12

## 2023-09-12 RX ORDER — BICTEGRAVIR SODIUM, EMTRICITABINE, AND TENOFOVIR ALAFENAMIDE FUMARATE 50; 200; 25 MG/1; MG/1; MG/1
50-200-25 TABLET ORAL
Qty: 90 | Refills: 3 | Status: ACTIVE | COMMUNITY
Start: 2019-02-20 | End: 1900-01-01

## 2023-09-29 ENCOUNTER — NON-APPOINTMENT (OUTPATIENT)
Age: 58
End: 2023-09-29

## 2023-09-29 ENCOUNTER — APPOINTMENT (OUTPATIENT)
Dept: CARDIOLOGY | Facility: CLINIC | Age: 58
End: 2023-09-29
Payer: COMMERCIAL

## 2023-09-29 VITALS
HEART RATE: 90 BPM | RESPIRATION RATE: 16 BRPM | HEIGHT: 63 IN | BODY MASS INDEX: 35.44 KG/M2 | WEIGHT: 200 LBS | OXYGEN SATURATION: 99 % | SYSTOLIC BLOOD PRESSURE: 168 MMHG | DIASTOLIC BLOOD PRESSURE: 81 MMHG

## 2023-09-29 DIAGNOSIS — E78.1 PURE HYPERGLYCERIDEMIA: ICD-10-CM

## 2023-09-29 PROCEDURE — 99215 OFFICE O/P EST HI 40 MIN: CPT

## 2023-09-29 PROCEDURE — 93000 ELECTROCARDIOGRAM COMPLETE: CPT

## 2023-09-29 RX ORDER — METHOCARBAMOL 750 MG/1
750 TABLET, FILM COATED ORAL
Qty: 24 | Refills: 0 | Status: ACTIVE | COMMUNITY
Start: 2023-09-29 | End: 1900-01-01

## 2023-09-29 RX ORDER — IBUPROFEN 200 MG/1
200 CAPSULE, LIQUID FILLED ORAL
Qty: 18 | Refills: 0 | Status: ACTIVE | COMMUNITY
Start: 2023-09-29 | End: 1900-01-01

## 2023-12-13 ENCOUNTER — APPOINTMENT (OUTPATIENT)
Dept: INFECTIOUS DISEASE | Facility: CLINIC | Age: 58
End: 2023-12-13

## 2023-12-13 ENCOUNTER — APPOINTMENT (OUTPATIENT)
Dept: INFECTIOUS DISEASE | Facility: CLINIC | Age: 58
End: 2023-12-13
Payer: COMMERCIAL

## 2023-12-13 VITALS
WEIGHT: 200 LBS | OXYGEN SATURATION: 98 % | DIASTOLIC BLOOD PRESSURE: 79 MMHG | HEART RATE: 93 BPM | BODY MASS INDEX: 35.44 KG/M2 | HEIGHT: 63 IN | SYSTOLIC BLOOD PRESSURE: 141 MMHG | TEMPERATURE: 97.8 F

## 2023-12-13 DIAGNOSIS — B20 HUMAN IMMUNODEFICIENCY VIRUS [HIV] DISEASE: ICD-10-CM

## 2023-12-13 DIAGNOSIS — Z01.00 ENCOUNTER FOR EXAMINATION OF EYES AND VISION W/OUT ABNORMAL FINDINGS: ICD-10-CM

## 2023-12-13 DIAGNOSIS — Z92.89 PERSONAL HISTORY OF OTHER MEDICAL TREATMENT: ICD-10-CM

## 2023-12-13 PROCEDURE — 99214 OFFICE O/P EST MOD 30 MIN: CPT

## 2023-12-13 NOTE — ASSESSMENT
[FreeTextEntry1] : plan 12/13/23 1) Continue Biktarvy 2) all labs today and covid and flu test 3) see in 6 month 4) needs screening colonoscopy ! 5) needs rheumatology for rheumatoid arthritis , I put in a referral

## 2023-12-13 NOTE — HISTORY OF PRESENT ILLNESS
[FreeTextEntry1] : 12/13/23-----BRENDA LEE is a pleasant 57 year old male being seen for an HIV follow-up visit. States feels like he has a cold today, head congested , wattery eyes, no cough, and afebrile Continue Biktarvy for HIV. May change to Odefsey due to weight gain....concern over 40lbs since 2019...always feels like snacking, [facility] Jose Morton and taking HCTZ and amlodipine 10mg daily. Vitamin D3 1000 UNIT Oral Capsule; TAKE ONE CAPSULE DAILY AS DIRECTED We changed his 14 pill regimen to one pill, Biktarvy . Pt sees arthritis MD Dr. Encarnacion at [phone]., He needs follow up and needs colonoscopy, states no family Hx of colon ca. non smoker, occasional drinks Pt had Pfizer COVID 19 vaccine both doses and flu vaccine for 2021.  plan 12/13/23 1) Continue Biktarvy 2) all labs today and covid and flu test 3) see in 6 month 4) needs screening colonoscopy ! 5) needs rheumatology for rheumatoid arthritis , I put in a referral       Diagnosed with HIV in 1993, from female. Pt was was giving blood at [facility] [facility]..then went to specialist and told HIV. Then went to see Dr Nohemy Roberts MD internal medicine in [facility] [phone]..she is now retiring? or leaving practice . He has seen her since 1993 to 2019. PMH: HIV, glaucoma. Surgical HX: Total Hip Replacement left hip from Rhematoid arthritis on 3/15/2018 done at [facility] in [place]. They did not say it was AVN.  was taking- stavudine 40mg BID, norvir 4 pills BID, Crixivan 400mg 2 tabs BID, nevirapine 200mg 1 pill bid. for glaucoma, alphagan daily, dopzolamide HCL daytime, latanoprost night, amoxicillin for dental.  Parents-father- passed from possible prostate. mother alive- thyroid.  Pt drink a few beers socially.  Sleeps only with females. has partner. not . 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 waiting for genosure archive.  Pt cell number [phone]  pt needs colonoscopy. Never had one.  needs to see rheumatolgy- referral today.      The patient has intercourse with women.  STI, Dates, Treatment: not sure.  Travel: no.  Occupation: receiving.  Partner Status: female.  Lives with his mother.  Who is aware of HIV status: no one knows.    Current Meds  Biktarvy  Alphagan P 0.15 % Ophthalmic Solution  Amoxicillin 500 MG Oral Capsule  Dorzolamide HCl - 2 % Ophthalmic Solution  Latanoprost SOLN    Active Problems  HIV disease (042) (B20)  Joint pain (719.40) (M25.50)  Rheumatoid arthritis (714.0) (M06.9)  Vitamin D deficiency (268.9) (E55.9)    Past Medical History  History of dental examination (V15.89) (Z92.89)  History of Visit for eye and vision exam (V72.0) (Z01.00)    Allergies  No Known Allergies

## 2023-12-14 ENCOUNTER — NON-APPOINTMENT (OUTPATIENT)
Age: 58
End: 2023-12-14

## 2023-12-14 PROBLEM — E78.5 HYPERLIPIDEMIA WITH TARGET LOW DENSITY LIPOPROTEIN (LDL) CHOLESTEROL LESS THAN 100 MG/DL: Status: ACTIVE | Noted: 2021-10-11

## 2023-12-14 PROBLEM — R07.89 ATYPICAL CHEST PAIN: Status: ACTIVE | Noted: 2023-09-29

## 2023-12-14 PROBLEM — I10 HTN (HYPERTENSION): Status: ACTIVE | Noted: 2021-09-22

## 2023-12-14 LAB
25(OH)D3 SERPL-MCNC: 28.2 NG/ML
ALBUMIN SERPL ELPH-MCNC: 4.2 G/DL
ALP BLD-CCNC: 74 U/L
ALT SERPL-CCNC: 15 U/L
ANION GAP SERPL CALC-SCNC: 16 MMOL/L
APPEARANCE: CLEAR
AST SERPL-CCNC: 23 U/L
BACTERIA: NEGATIVE /HPF
BILIRUB SERPL-MCNC: 0.3 MG/DL
BILIRUBIN URINE: NEGATIVE
BLOOD URINE: NEGATIVE
BUN SERPL-MCNC: 13 MG/DL
C TRACH RRNA SPEC QL NAA+PROBE: NOT DETECTED
CALCIUM SERPL-MCNC: 9.3 MG/DL
CAST: 0 /LPF
CD3 CELLS # BLD: 1304 CELLS/UL
CD3 CELLS NFR BLD: 69 %
CD3+CD4+ CELLS # BLD: 1004 CELLS/UL
CD3+CD4+ CELLS NFR BLD: 53 %
CD3+CD4+ CELLS/CD3+CD8+ CLL SPEC: 3.51 RATIO
CD3+CD8+ CELLS # SPEC: 286 CELLS/UL
CD3+CD8+ CELLS NFR BLD: 15 %
CHLORIDE SERPL-SCNC: 100 MMOL/L
CHOLEST SERPL-MCNC: 175 MG/DL
CO2 SERPL-SCNC: 24 MMOL/L
COLOR: YELLOW
CREAT SERPL-MCNC: 0.98 MG/DL
EGFR: 91 ML/MIN/1.73M2
EPITHELIAL CELLS: 0 /HPF
ESTIMATED AVERAGE GLUCOSE: 126 MG/DL
GLUCOSE QUALITATIVE U: NEGATIVE MG/DL
GLUCOSE SERPL-MCNC: 195 MG/DL
HBA1C MFR BLD HPLC: 6 %
HCT VFR BLD CALC: 43.4 %
HDLC SERPL-MCNC: 43 MG/DL
HGB BLD-MCNC: 14.3 G/DL
HIV1 RNA # SERPL NAA+PROBE: NORMAL
HIV1 RNA # SERPL NAA+PROBE: NORMAL COPIES/ML
KETONES URINE: NEGATIVE MG/DL
LDLC SERPL CALC-MCNC: 114 MG/DL
LEUKOCYTE ESTERASE URINE: NEGATIVE
MCHC RBC-ENTMCNC: 28.9 PG
MCHC RBC-ENTMCNC: 32.9 GM/DL
MCV RBC AUTO: 87.7 FL
MICROSCOPIC-UA: NORMAL
N GONORRHOEA RRNA SPEC QL NAA+PROBE: NOT DETECTED
NITRITE URINE: NEGATIVE
NONHDLC SERPL-MCNC: 132 MG/DL
PH URINE: 5.5
PLATELET # BLD AUTO: 200 K/UL
POTASSIUM SERPL-SCNC: 3.3 MMOL/L
PROT SERPL-MCNC: 7.7 G/DL
PROTEIN URINE: NEGATIVE MG/DL
PSA SERPL-MCNC: 1.63 NG/ML
RAPID RVP RESULT: DETECTED
RBC # BLD: 4.95 M/UL
RBC # FLD: 14.1 %
RED BLOOD CELLS URINE: 2 /HPF
RSV RNA SPEC QL NAA+PROBE: DETECTED
SARS-COV-2 RNA PNL RESP NAA+PROBE: NOT DETECTED
SODIUM SERPL-SCNC: 140 MMOL/L
SOURCE AMPLIFICATION: NORMAL
SPECIFIC GRAVITY URINE: 1.02
T PALLIDUM AB SER QL IA: NEGATIVE
TRIGL SERPL-MCNC: 98 MG/DL
TSH SERPL-ACNC: 1.43 UIU/ML
UROBILINOGEN URINE: 0.2 MG/DL
VIRAL LOAD INTERP: NORMAL
VIRAL LOAD LOG: NORMAL LG COP/ML
WBC # FLD AUTO: 8.3 K/UL
WHITE BLOOD CELLS URINE: 0 /HPF

## 2023-12-19 ENCOUNTER — APPOINTMENT (OUTPATIENT)
Dept: CARDIOLOGY | Facility: CLINIC | Age: 58
End: 2023-12-19

## 2023-12-19 DIAGNOSIS — E78.5 HYPERLIPIDEMIA, UNSPECIFIED: ICD-10-CM

## 2023-12-19 DIAGNOSIS — I10 ESSENTIAL (PRIMARY) HYPERTENSION: ICD-10-CM

## 2023-12-19 DIAGNOSIS — R07.89 OTHER CHEST PAIN: ICD-10-CM

## 2024-05-06 ENCOUNTER — APPOINTMENT (OUTPATIENT)
Dept: RHEUMATOLOGY | Facility: CLINIC | Age: 59
End: 2024-05-06
Payer: COMMERCIAL

## 2024-05-10 RX ORDER — HYDROCHLOROTHIAZIDE 25 MG/1
25 TABLET ORAL
Qty: 90 | Refills: 0 | Status: ACTIVE | COMMUNITY
Start: 2021-10-11 | End: 1900-01-01

## 2024-05-28 ENCOUNTER — APPOINTMENT (OUTPATIENT)
Dept: RHEUMATOLOGY | Facility: CLINIC | Age: 59
End: 2024-05-28
Payer: COMMERCIAL

## 2024-05-28 VITALS
RESPIRATION RATE: 16 BRPM | HEART RATE: 98 BPM | SYSTOLIC BLOOD PRESSURE: 144 MMHG | DIASTOLIC BLOOD PRESSURE: 83 MMHG | OXYGEN SATURATION: 98 % | BODY MASS INDEX: 35.79 KG/M2 | WEIGHT: 202 LBS | HEIGHT: 63 IN

## 2024-05-28 DIAGNOSIS — M25.50 PAIN IN UNSPECIFIED JOINT: ICD-10-CM

## 2024-05-28 DIAGNOSIS — M06.9 RHEUMATOID ARTHRITIS, UNSPECIFIED: ICD-10-CM

## 2024-05-28 PROCEDURE — 99204 OFFICE O/P NEW MOD 45 MIN: CPT | Mod: GC

## 2024-05-29 LAB
ALBUMIN SERPL ELPH-MCNC: 4.3 G/DL
ALP BLD-CCNC: 77 U/L
ALT SERPL-CCNC: 19 U/L
ANION GAP SERPL CALC-SCNC: 13 MMOL/L
AST SERPL-CCNC: 27 U/L
BASOPHILS # BLD AUTO: 0.04 K/UL
BASOPHILS NFR BLD AUTO: 0.4 %
BILIRUB SERPL-MCNC: 0.4 MG/DL
BUN SERPL-MCNC: 17 MG/DL
CALCIUM SERPL-MCNC: 9.7 MG/DL
CCP AB SER IA-ACNC: >250 UNITS
CHLORIDE SERPL-SCNC: 99 MMOL/L
CO2 SERPL-SCNC: 25 MMOL/L
CREAT SERPL-MCNC: 1.02 MG/DL
CRP SERPL-MCNC: 8 MG/L
EGFR: 86 ML/MIN/1.73M2
EOSINOPHIL # BLD AUTO: 0.35 K/UL
EOSINOPHIL NFR BLD AUTO: 3.8 %
ERYTHROCYTE [SEDIMENTATION RATE] IN BLOOD BY WESTERGREN METHOD: 61 MM/HR
HBV CORE IGG+IGM SER QL: NONREACTIVE
HBV SURFACE AG SER QL: NONREACTIVE
HCT VFR BLD CALC: 42 %
HCV AB SER QL: NONREACTIVE
HCV S/CO RATIO: 0.14 S/CO
HGB BLD-MCNC: 14.2 G/DL
IMM GRANULOCYTES NFR BLD AUTO: 0.3 %
LYMPHOCYTES # BLD AUTO: 3.8 K/UL
LYMPHOCYTES NFR BLD AUTO: 41.3 %
MAN DIFF?: NORMAL
MCHC RBC-ENTMCNC: 28.6 PG
MCHC RBC-ENTMCNC: 33.8 GM/DL
MCV RBC AUTO: 84.5 FL
MONOCYTES # BLD AUTO: 0.73 K/UL
MONOCYTES NFR BLD AUTO: 7.9 %
NEUTROPHILS # BLD AUTO: 4.25 K/UL
NEUTROPHILS NFR BLD AUTO: 46.3 %
PLATELET # BLD AUTO: 221 K/UL
POTASSIUM SERPL-SCNC: 3.8 MMOL/L
PROT SERPL-MCNC: 8.2 G/DL
RBC # BLD: 4.97 M/UL
RBC # FLD: 14.7 %
RF+CCP IGG SER-IMP: ABNORMAL
RHEUMATOID FACT SER QL: 403 IU/ML
SODIUM SERPL-SCNC: 137 MMOL/L
WBC # FLD AUTO: 9.2 K/UL

## 2024-05-29 NOTE — DATA REVIEWED
[FreeTextEntry1] : labs and imaging personally reviewed with patient today  11/2023 CBC and CMP stable, VL undetectable  x-rays hands and feet in 2019 with no erosive changes  RF >650, CCP 52

## 2024-05-29 NOTE — PHYSICAL EXAM
[General Appearance - Alert] : alert [General Appearance - In No Acute Distress] : in no acute distress [Sclera] : the sclera and conjunctiva were normal [Outer Ear] : the ears and nose were normal in appearance [Oropharynx] : the oropharynx was normal [Neck Appearance] : the appearance of the neck was normal [Respiration, Rhythm And Depth] : normal respiratory rhythm and effort [Auscultation Breath Sounds / Voice Sounds] : lungs were clear to auscultation bilaterally [Heart Sounds] : normal S1 and S2 [Murmurs] : no murmurs [Abnormal Walk] : normal gait [Nail Clubbing] : no clubbing  or cyanosis of the fingernails [Motor Tone] : muscle strength and tone were normal [] : no rash [No Focal Deficits] : no focal deficits [Oriented To Time, Place, And Person] : oriented to person, place, and time [Impaired Insight] : insight and judgment were intact [Affect] : the affect was normal [FreeTextEntry1] : synovitis of multiple PIPs, tenderness of b/l wrists, MCPs, and PIPs, L shoulder, and bilateral knees. decreased ROM of b/l hands and L shoulder

## 2024-05-29 NOTE — HISTORY OF PRESENT ILLNESS
[Currently Experiencing] : currently [Arthralgias] : arthralgias [Decreased ROM] : decreased range of motion [RF] : rheumatoid factor [CCP] : Cyclic citrullinated peptide (CCP) antibody [Joint Swelling] : joint swelling [Joint Pain] : joint pain [Morning Stiffness] : morning stiffness [FreeTextEntry1] : This is a 58ym with MHx seropositive RA, HTN, HLD, and HIV who presents to re-establish care for RA.   Pt was diagnosed with RA in 2016 at [facility] after presenting with joint pain, swelling, and morning stiffness. Was on MTX PO weekly for 1-2 years. Then developed L hip pain and underwent THR surgery 3/2018 and methotrexate was discontinued prior to surgery. Stopped taking MTX after surgery when someone told him that it contributed to his hip pain requiring surgery. Re-established care with Rheumatology Dr. Moss [phone] and was restarted on MTX 17.5 PO weekly; joint symptoms were well controlled while on MTX. Then again was lost to follow up.   Now presents with 2 year history of joint pain and swelling in bilateral hands, wrists, MCPs, PIPs, shoulders (L > R), knees, and ankles. Also with significant morning stiffness lasting multiple hours. Notes decreased ROM of his wrists and fingers, unable to fully make a fist. Symptoms improve with exercise, worse at rest. Has been taking Aleve 600-800 mg 3-4 times a week with relief of symptoms.   No fevers, chills, weight loss, visual changes, cough, chest pain, SOB, rashes, ulcers, abdominal symptoms, or urinary changes   [Anorexia] : no anorexia [Weight Loss] : no weight loss [Malaise] : no malaise [Fever] : no fever [Chills] : no chills [Fatigue] : no fatigue [Depression] : no depression [Malar Facial Rash] : no malar facial rash [Skin Lesions] : no lesions [Skin Nodules] : no skin nodules [Oral Ulcers] : no oral ulcers [Cough] : no cough [Dry Mouth] : no dry mouth [Dysphonia] : no dysphonia [Dysphagia] : no dysphagia [Shortness of Breath] : no shortness of breath [Chest Pain] : no chest pain [Joint Warmth] : no joint warmth [Joint Deformity] : no joint deformity [Falls] : no falls [Difficulty Standing] : no difficulty standing [Difficulty Walking] : no difficulty walking [Dyspnea] : no dyspnea [Myalgias] : no myalgias [Muscle Weakness] : no muscle weakness [Muscle Spasms] : no muscle spasms [Muscle Cramping] : no muscle cramping [Visual Changes] : no visual changes [Eye Pain] : no eye pain [Eye Redness] : no eye redness [Dry Eyes] : no dry eyes [Erosions] : no erosions [TextBox_2] : 2016 [TextBox_38] : MTX

## 2024-05-29 NOTE — ASSESSMENT
[FreeTextEntry1] : This is a 58ym with MHx seropositive RA, HTN, HLD, and HIV who presents to re-establish care for RA.   #Seropositive RA (dx 2016) with active disease off immunosuppression since 2021  -Lost to f/u since 2021 now with significant joint pain, swelling, and morning stiffness in b/l symmetric distribution  -Stopped taking MTX 3 years ago; has been taking Aleve 600-800 mg 3-4 times a week for 2 years with relief of symptoms.  -Synovitis as noted above (may also be somewhat masked due to regular NSAID use) -Counseled on the risks of longstanding NSAID use and need for immunosuppression to control disease activity   [] Will check CBC, CMP, ESR, CRP, quantiferon, and hepatitis screen to assess for disease activity and infection screening in anticipation of restarting immunosuppression  [] X-rays bilateral hands and feet to assess for erosive RA changes that would affect management [] Baseline CXR  [] X-ray C-spine flexion/extension to assess for A-A instability  [] Pending above workup will decide on immunosuppressive agent and will also discuss with ID given HIV (though it has been well controlled with undetectable VL)   RTO pending above workup  Discussed with Dr. Edward Mendez MD Rheumatology Fellow

## 2024-05-29 NOTE — CONSULT LETTER
[Dear  ___] : Dear  [unfilled], [Please see my note below.] : Please see my note below. [Sincerely,] : Sincerely, [FreeTextEntry3] : Starr Mccarthy MD , [facility] at [facility] Division of Rheumatology

## 2024-05-30 LAB
ENA SS-A AB SER IA-ACNC: <0.2 AL
ENA SS-B AB SER IA-ACNC: 0.3 AL

## 2024-05-31 LAB
M TB IFN-G BLD-IMP: NEGATIVE
QUANTIFERON TB PLUS MITOGEN MINUS NIL: >10 IU/ML
QUANTIFERON TB PLUS NIL: 0.02 IU/ML
QUANTIFERON TB PLUS TB1 MINUS NIL: 0 IU/ML
QUANTIFERON TB PLUS TB2 MINUS NIL: 0 IU/ML

## 2024-06-04 ENCOUNTER — NON-APPOINTMENT (OUTPATIENT)
Age: 59
End: 2024-06-04

## 2024-06-17 ENCOUNTER — APPOINTMENT (OUTPATIENT)
Dept: CARDIOLOGY | Facility: CLINIC | Age: 59
End: 2024-06-17

## 2024-06-19 ENCOUNTER — APPOINTMENT (OUTPATIENT)
Dept: INFECTIOUS DISEASE | Facility: CLINIC | Age: 59
End: 2024-06-19

## 2024-07-09 ENCOUNTER — APPOINTMENT (OUTPATIENT)
Dept: RADIOLOGY | Facility: CLINIC | Age: 59
End: 2024-07-09
Payer: COMMERCIAL

## 2024-07-09 PROCEDURE — 73620 X-RAY EXAM OF FOOT: CPT | Mod: 50

## 2024-07-09 PROCEDURE — 72052 X-RAY EXAM NECK SPINE 6/>VWS: CPT

## 2024-07-09 PROCEDURE — 71046 X-RAY EXAM CHEST 2 VIEWS: CPT

## 2024-07-09 PROCEDURE — 73130 X-RAY EXAM OF HAND: CPT | Mod: 50

## 2024-07-12 ENCOUNTER — APPOINTMENT (OUTPATIENT)
Dept: CARDIOLOGY | Facility: CLINIC | Age: 59
End: 2024-07-12

## 2024-07-12 DIAGNOSIS — I10 ESSENTIAL (PRIMARY) HYPERTENSION: ICD-10-CM

## 2024-07-12 DIAGNOSIS — E78.5 HYPERLIPIDEMIA, UNSPECIFIED: ICD-10-CM

## 2024-07-15 ENCOUNTER — NON-APPOINTMENT (OUTPATIENT)
Age: 59
End: 2024-07-15

## 2024-07-16 ENCOUNTER — APPOINTMENT (OUTPATIENT)
Dept: RHEUMATOLOGY | Facility: CLINIC | Age: 59
End: 2024-07-16
Payer: COMMERCIAL

## 2024-07-16 VITALS
WEIGHT: 200 LBS | HEIGHT: 63 IN | DIASTOLIC BLOOD PRESSURE: 77 MMHG | BODY MASS INDEX: 35.44 KG/M2 | HEART RATE: 80 BPM | OXYGEN SATURATION: 98 % | SYSTOLIC BLOOD PRESSURE: 144 MMHG

## 2024-07-16 DIAGNOSIS — M06.9 RHEUMATOID ARTHRITIS, UNSPECIFIED: ICD-10-CM

## 2024-07-16 PROCEDURE — 99214 OFFICE O/P EST MOD 30 MIN: CPT

## 2024-07-16 PROCEDURE — G2211 COMPLEX E/M VISIT ADD ON: CPT

## 2024-08-01 ENCOUNTER — APPOINTMENT (OUTPATIENT)
Dept: CT IMAGING | Facility: CLINIC | Age: 59
End: 2024-08-01
Payer: COMMERCIAL

## 2024-08-01 ENCOUNTER — OUTPATIENT (OUTPATIENT)
Dept: OUTPATIENT SERVICES | Facility: HOSPITAL | Age: 59
LOS: 1 days | End: 2024-08-01
Payer: COMMERCIAL

## 2024-08-01 DIAGNOSIS — Z00.8 ENCOUNTER FOR OTHER GENERAL EXAMINATION: ICD-10-CM

## 2024-08-01 PROCEDURE — 71250 CT THORAX DX C-: CPT | Mod: 26

## 2024-08-01 PROCEDURE — 71250 CT THORAX DX C-: CPT

## 2024-08-06 ENCOUNTER — RX RENEWAL (OUTPATIENT)
Age: 59
End: 2024-08-06

## 2024-08-09 ENCOUNTER — RX RENEWAL (OUTPATIENT)
Age: 59
End: 2024-08-09

## 2024-08-13 ENCOUNTER — APPOINTMENT (OUTPATIENT)
Dept: RHEUMATOLOGY | Facility: CLINIC | Age: 59
End: 2024-08-13
Payer: COMMERCIAL

## 2024-08-13 VITALS
BODY MASS INDEX: 35.44 KG/M2 | OXYGEN SATURATION: 98 % | WEIGHT: 200 LBS | DIASTOLIC BLOOD PRESSURE: 77 MMHG | RESPIRATION RATE: 16 BRPM | SYSTOLIC BLOOD PRESSURE: 156 MMHG | HEART RATE: 81 BPM | HEIGHT: 63 IN

## 2024-08-13 DIAGNOSIS — M05.9 RHEUMATOID ARTHRITIS WITH RHEUMATOID FACTOR, UNSPECIFIED: ICD-10-CM

## 2024-08-13 DIAGNOSIS — M06.9 RHEUMATOID ARTHRITIS, UNSPECIFIED: ICD-10-CM

## 2024-08-13 DIAGNOSIS — J84.9 INTERSTITIAL PULMONARY DISEASE, UNSPECIFIED: ICD-10-CM

## 2024-08-13 DIAGNOSIS — M05.80 OTHER RHEUMATOID ARTHRITIS WITH RHEUMATOID FACTOR OF UNSPECIFIED SITE: ICD-10-CM

## 2024-08-13 DIAGNOSIS — Z79.899 OTHER LONG TERM (CURRENT) DRUG THERAPY: ICD-10-CM

## 2024-08-13 PROCEDURE — G2211 COMPLEX E/M VISIT ADD ON: CPT

## 2024-08-13 PROCEDURE — 99215 OFFICE O/P EST HI 40 MIN: CPT

## 2024-08-13 RX ORDER — LEFLUNOMIDE 20 MG/1
20 TABLET, FILM COATED ORAL
Qty: 30 | Refills: 0 | Status: ACTIVE | COMMUNITY
Start: 2024-08-13 | End: 1900-01-01

## 2024-08-13 NOTE — HISTORY OF PRESENT ILLNESS
[FreeTextEntry1] : Initial history 5/28/24  This is a 58ym with MHx seropositive RA, HTN, HLD, and HIV who presents to re-establish care for RA.  Pt was diagnosed with RA in 2016 at [facility] after presenting with joint pain, swelling, and morning stiffness. Was on MTX PO weekly for 1-2 years. Then developed L hip pain and underwent THR surgery 3/2018 and methotrexate was discontinued prior to surgery. Stopped taking MTX after surgery when someone told him that it contributed to his hip pain requiring surgery. Re-established care with Rheumatology Dr. Moss [phone] and was restarted on MTX 17.5 PO weekly; joint symptoms were well controlled while on MTX. Then again was lost to follow up.  Now presents with 2 year history of joint pain and swelling in bilateral hands, wrists, MCPs, PIPs, shoulders (L > R), knees, and ankles. Also with significant morning stiffness lasting multiple hours. Notes decreased ROM of his wrists and fingers, unable to fully make a fist. Symptoms improve with exercise, worse at rest. Has been taking Aleve 600-800 mg 3-4 times a week with relief of symptoms.  No fevers, chills, weight loss, visual changes, cough, chest pain, SOB, rashes, ulcers, abdominal symptoms, or urinary changes The patient is currently experiencing arthralgias, joint swelling, decreased range of motion and morning stiffness, but no anorexia, no weight loss, no malaise, no fever, no chills, no fatigue, no depression, no malar facial rash, no lesions, no skin nodules, no oral ulcers, no cough, no dry mouth, no dysphonia, no dysphagia, no shortness of breath, no chest pain, no joint warmth, no joint deformity, no falls, no difficulty standing, no difficulty walking, no dyspnea, no myalgias, no muscle weakness, no muscle spasms, no muscle cramping, no visual changes, no eye pain, no eye redness and no dry eyes.  Rheumatoid Arthritis: The patient was diagnosed with inflammatory arthritis in 2016 Antibody Profile: rheumatoid factor, Cyclic citrullinated peptide (CCP) antibody Presence of: no erosions Previous cDMARD: MTX Previous Manifestations: joint swelling, joint pain, morning stiffness Currently, patient is experiencing joint swelling, joint pain, morning stiffness.

## 2024-08-13 NOTE — ASSESSMENT
[FreeTextEntry1] : #Seropositive RA (dx 2016) with active disease off immunosuppression since 2021 -Lost to f/u since 2021 now with significant joint pain, swelling, and morning stiffness in b/l symmetric distribution -Stopped taking MTX 3 years ago; has been taking Aleve 600-800 mg 3-4 times a week for 2 years with relief of symptoms. -Synovitis as noted above (may also be somewhat masked due to regular NSAID use) -Repeat work up after last visit: +RF and CCP, elevated inflammatory markers X-rays of feet right Small chronic erosion appears to involve the 1st metatarsal head, new from the prior. X-rays of hands: no erosions X-rays c-spine: no AA instability -discussed resuming MTX with likely need to step up to biologic given erosive nature he is endorsing today regular alcohol use, counseled would avoid MTX, will try leflunomide -given erosive disease, possible associated ILD ( as below) and underlying HIV, would like to proceed with Orencia risks/benefits/alternatives/side effects reviewed with patient at length, agreeable discussed with ID, no CI or interaction with his HIV meds  -obtain repeat labs today  # Possible ILD prominent interstitial markings bilaterally seen on CXR currently asymptomatic but had a respiratory illness back in March or May he can't recall CT chest 8/1/24 Peripheral reticulation and mild bronchiectasis. No honeycombing. Mildly enlarged mediastinal lymph nodes. Findings may represent interstitial lung disease. Right upper lobe 3 mm nodule. One-year follow-up chest CT recommended. Pulm referral to establish care, baseline PFTs   RTO in 3-4 weeks

## 2024-08-13 NOTE — PHYSICAL EXAM
[General Appearance - Alert] : alert [General Appearance - In No Acute Distress] : in no acute distress [Auscultation Breath Sounds / Voice Sounds] : lungs were clear to auscultation bilaterally [] : no rash [Impaired Insight] : insight and judgment were intact [FreeTextEntry1] : tenderness synovitis at bilateral third PIPs

## 2024-08-13 NOTE — DATA REVIEWED
[FreeTextEntry1] : Labs, imaging and chart notes reviewed today with patient elevated inflammatory markers + RF and CCP erosion at right firt MTP.  CT chest possible early ILD

## 2024-08-13 NOTE — REVIEW OF SYSTEMS
[Joint Pain] : joint pain [Joint Swelling] : joint swelling [Negative] : Neurological [Fever] : no fever [Chills] : no chills [Shortness Of Breath] : no shortness of breath [Cough] : no cough

## 2024-08-14 ENCOUNTER — APPOINTMENT (OUTPATIENT)
Dept: INFECTIOUS DISEASE | Facility: CLINIC | Age: 59
End: 2024-08-14
Payer: COMMERCIAL

## 2024-08-14 VITALS
BODY MASS INDEX: 33.84 KG/M2 | DIASTOLIC BLOOD PRESSURE: 76 MMHG | TEMPERATURE: 98.3 F | HEART RATE: 78 BPM | SYSTOLIC BLOOD PRESSURE: 146 MMHG | HEIGHT: 63 IN | WEIGHT: 191 LBS | OXYGEN SATURATION: 97 %

## 2024-08-14 DIAGNOSIS — B20 HUMAN IMMUNODEFICIENCY VIRUS [HIV] DISEASE: ICD-10-CM

## 2024-08-14 DIAGNOSIS — Z01.00 ENCOUNTER FOR EXAMINATION OF EYES AND VISION W/OUT ABNORMAL FINDINGS: ICD-10-CM

## 2024-08-14 DIAGNOSIS — Z92.89 PERSONAL HISTORY OF OTHER MEDICAL TREATMENT: ICD-10-CM

## 2024-08-14 LAB
CHOLEST SERPL-MCNC: 181 MG/DL
HDLC SERPL-MCNC: 42 MG/DL
LDLC SERPL CALC-MCNC: 123 MG/DL
NONHDLC SERPL-MCNC: 140 MG/DL
PSA SERPL-MCNC: 2.17 NG/ML
TRIGL SERPL-MCNC: 90 MG/DL

## 2024-08-14 PROCEDURE — 99214 OFFICE O/P EST MOD 30 MIN: CPT

## 2024-08-14 NOTE — ASSESSMENT
[FreeTextEntry1] : 8/14/2024---plan --chronic stable HIV and chronic stable HTN and Hyperlipidemia  1) Continue Biktarvy -25mg oral tablet daily ( reviewed and renewed)  2) all labs reviewed today and new labs today including cbc, bmp , tsh, cd4, viral load, urine,  3) see in 6 month 4) needs screening colonoscopy ! 5) external provider notes reviewed

## 2024-08-14 NOTE — HISTORY OF PRESENT ILLNESS
[FreeTextEntry1] : reason for visit---chronic stable HIV and chronic stable HTN and Hyperlipidemia  ---BRENDA LEE is a pleasant 57 year old male being seen for an HIV follow-up visit. States feels good, he is starting on a new medicine with rheumatology for his Rheumatoid arthritis Continue Biktarvy for HIV. states eating healthier and lost over 10lbs.  [facility] Jose Morton and taking HCTZ and amlodipine 10mg daily. Vitamin D3 1000 UNIT Oral Capsule; TAKE ONE CAPSULE DAILY AS DIRECTED We originally changed his 14 pill regimen to one pill, Biktarvy . Pt sees arthritis MD Dr. Encarnacion at [phone]., He needs follow up and needs colonoscopy, states no family Hx of colon ca. non smoker, occasional drinks Pt had Pfizer COVID 19 vaccine both doses and flu vaccine for 2021.  8/14/2024---plan --chronic stable HIV and chronic stable HTN and Hyperlipidemia  1) Continue Biktarvy -25mg oral tablet daily ( reviewed and renewed)  2) all labs reviewed today and new labs today including cbc, bmp , tsh, cd4, viral load, urine,  3) see in 6 month 4) needs screening colonoscopy ! 5) external provider notes reviewed      Diagnosed with HIV in 1993, from female. Pt was was giving blood at blood [employer]..then went to specialist and told HIV. Then went to see Dr Nohemy Roberts MD internal medicine in [facility] [phone]..she is now retiring? or leaving practice . He has seen her since 1993 to 2019. PMH: HIV, glaucoma. Surgical HX: Total Hip Replacement left hip from Rhematoid arthritis on 3/15/2018 done at [facility] in [place]. They did not say it was AVN.  was taking- stavudine 40mg BID, norvir 4 pills BID, Crixivan 400mg 2 tabs BID, nevirapine 200mg 1 pill bid. for glaucoma, alphagan daily, dopzolamide HCL daytime, latanoprost night, amoxicillin for dental.  Parents-father- passed from possible prostate. mother alive- thyroid.  Pt drink a few beers socially.  Sleeps only with females. has partner. not . 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 waiting for genosure archive.  Pt cell number [phone]  pt needs colonoscopy. Never had one.  needs to see rheumatolgy- referral today.      The patient has intercourse with women.  STI, Dates, Treatment: not sure.  Travel: no.  Occupation: receiving.  Partner Status: female.  Lives with his mother.  Who is aware of HIV status: no one knows.    Current Meds  Biktarvy  Alphagan P 0.15 % Ophthalmic Solution  Amoxicillin 500 MG Oral Capsule  Dorzolamide HCl - 2 % Ophthalmic Solution  Latanoprost SOLN    Active Problems  HIV disease (042) (B20)  Joint pain (719.40) (M25.50)  Rheumatoid arthritis (714.0) (M06.9)  Vitamin D deficiency (268.9) (E55.9)    Past Medical History  History of dental examination (V15.89) (Z92.89)  History of Visit for eye and vision exam (V72.0) (Z01.00)    Allergies  No Known Allergies

## 2024-08-15 LAB
CD3 CELLS # BLD: 1868 CELLS/UL
CD3 CELLS NFR BLD: 85 %
CD3+CD4+ CELLS # BLD: 1435 CELLS/UL
CD3+CD4+ CELLS NFR BLD: 65 %
CD3+CD4+ CELLS/CD3+CD8+ CLL SPEC: 3.38 RATIO
CD3+CD8+ CELLS # SPEC: 425 CELLS/UL
CD3+CD8+ CELLS NFR BLD: 19 %
HIV1 RNA # SERPL NAA+PROBE: NORMAL
HIV1 RNA # SERPL NAA+PROBE: NORMAL COPIES/ML
VIRAL LOAD INTERP: NORMAL
VIRAL LOAD LOG: NORMAL LG COP/ML

## 2024-08-15 RX ORDER — ABATACEPT 125 MG/ML
125 INJECTION, SOLUTION SUBCUTANEOUS
Qty: 1 | Refills: 2 | Status: ACTIVE | COMMUNITY
Start: 2024-08-13 | End: 1900-01-01

## 2024-08-29 ENCOUNTER — RX RENEWAL (OUTPATIENT)
Age: 59
End: 2024-08-29

## 2024-09-10 ENCOUNTER — APPOINTMENT (OUTPATIENT)
Dept: RHEUMATOLOGY | Facility: CLINIC | Age: 59
End: 2024-09-10

## 2024-09-25 ENCOUNTER — APPOINTMENT (OUTPATIENT)
Dept: CARDIOLOGY | Facility: CLINIC | Age: 59
End: 2024-09-25
Payer: COMMERCIAL

## 2024-09-25 ENCOUNTER — NON-APPOINTMENT (OUTPATIENT)
Age: 59
End: 2024-09-25

## 2024-09-25 VITALS
DIASTOLIC BLOOD PRESSURE: 72 MMHG | SYSTOLIC BLOOD PRESSURE: 148 MMHG | HEIGHT: 63 IN | BODY MASS INDEX: 34.02 KG/M2 | WEIGHT: 192 LBS | HEART RATE: 86 BPM

## 2024-09-25 DIAGNOSIS — I10 ESSENTIAL (PRIMARY) HYPERTENSION: ICD-10-CM

## 2024-09-25 DIAGNOSIS — E78.1 PURE HYPERGLYCERIDEMIA: ICD-10-CM

## 2024-09-25 DIAGNOSIS — R01.1 CARDIAC MURMUR, UNSPECIFIED: ICD-10-CM

## 2024-09-25 PROCEDURE — 99214 OFFICE O/P EST MOD 30 MIN: CPT | Mod: 25

## 2024-09-25 PROCEDURE — 99401 PREV MED CNSL INDIV APPRX 15: CPT

## 2024-09-25 PROCEDURE — 93000 ELECTROCARDIOGRAM COMPLETE: CPT

## 2024-09-26 ENCOUNTER — NON-APPOINTMENT (OUTPATIENT)
Age: 59
End: 2024-09-26

## 2024-10-08 ENCOUNTER — NON-APPOINTMENT (OUTPATIENT)
Age: 59
End: 2024-10-08

## 2024-10-24 ENCOUNTER — APPOINTMENT (OUTPATIENT)
Dept: RHEUMATOLOGY | Facility: CLINIC | Age: 59
End: 2024-10-24
Payer: COMMERCIAL

## 2024-10-24 VITALS
DIASTOLIC BLOOD PRESSURE: 79 MMHG | OXYGEN SATURATION: 98 % | BODY MASS INDEX: 34.55 KG/M2 | HEART RATE: 86 BPM | HEIGHT: 63 IN | SYSTOLIC BLOOD PRESSURE: 138 MMHG | WEIGHT: 195 LBS

## 2024-10-24 DIAGNOSIS — Z79.899 OTHER LONG TERM (CURRENT) DRUG THERAPY: ICD-10-CM

## 2024-10-24 DIAGNOSIS — J84.9 INTERSTITIAL PULMONARY DISEASE, UNSPECIFIED: ICD-10-CM

## 2024-10-24 DIAGNOSIS — M05.80 OTHER RHEUMATOID ARTHRITIS WITH RHEUMATOID FACTOR OF UNSPECIFIED SITE: ICD-10-CM

## 2024-10-24 PROCEDURE — 99214 OFFICE O/P EST MOD 30 MIN: CPT

## 2024-10-24 PROCEDURE — G2211 COMPLEX E/M VISIT ADD ON: CPT

## 2024-10-25 LAB
ALBUMIN SERPL ELPH-MCNC: 4 G/DL
ALP BLD-CCNC: 70 U/L
ALT SERPL-CCNC: 15 U/L
ANION GAP SERPL CALC-SCNC: 16 MMOL/L
AST SERPL-CCNC: 21 U/L
BASOPHILS # BLD AUTO: 0.03 K/UL
BASOPHILS NFR BLD AUTO: 0.4 %
BILIRUB SERPL-MCNC: 0.6 MG/DL
BUN SERPL-MCNC: 12 MG/DL
CALCIUM SERPL-MCNC: 9.3 MG/DL
CHLORIDE SERPL-SCNC: 100 MMOL/L
CO2 SERPL-SCNC: 22 MMOL/L
CREAT SERPL-MCNC: 0.89 MG/DL
CRP SERPL-MCNC: 7 MG/L
EGFR: 101 ML/MIN/1.73M2
EOSINOPHIL # BLD AUTO: 0.36 K/UL
EOSINOPHIL NFR BLD AUTO: 4.6 %
ERYTHROCYTE [SEDIMENTATION RATE] IN BLOOD BY WESTERGREN METHOD: 71 MM/HR
HCT VFR BLD CALC: 45.4 %
HGB BLD-MCNC: 14.9 G/DL
IMM GRANULOCYTES NFR BLD AUTO: 0.4 %
LYMPHOCYTES # BLD AUTO: 2.44 K/UL
LYMPHOCYTES NFR BLD AUTO: 31.1 %
MAN DIFF?: NORMAL
MCHC RBC-ENTMCNC: 28.8 PG
MCHC RBC-ENTMCNC: 32.8 GM/DL
MCV RBC AUTO: 87.8 FL
MONOCYTES # BLD AUTO: 0.33 K/UL
MONOCYTES NFR BLD AUTO: 4.2 %
NEUTROPHILS # BLD AUTO: 4.65 K/UL
NEUTROPHILS NFR BLD AUTO: 59.3 %
PLATELET # BLD AUTO: 200 K/UL
POTASSIUM SERPL-SCNC: 3.6 MMOL/L
PROT SERPL-MCNC: 7.4 G/DL
RBC # BLD: 5.17 M/UL
RBC # FLD: 13.9 %
SODIUM SERPL-SCNC: 139 MMOL/L
WBC # FLD AUTO: 7.84 K/UL

## 2024-11-27 ENCOUNTER — APPOINTMENT (OUTPATIENT)
Dept: PULMONOLOGY | Facility: CLINIC | Age: 59
End: 2024-11-27
Payer: COMMERCIAL

## 2024-11-27 VITALS
HEIGHT: 63 IN | HEART RATE: 92 BPM | DIASTOLIC BLOOD PRESSURE: 82 MMHG | SYSTOLIC BLOOD PRESSURE: 135 MMHG | BODY MASS INDEX: 33.66 KG/M2 | WEIGHT: 190 LBS

## 2024-11-27 DIAGNOSIS — M05.9 RHEUMATOID ARTHRITIS WITH RHEUMATOID FACTOR, UNSPECIFIED: ICD-10-CM

## 2024-11-27 DIAGNOSIS — B20 HUMAN IMMUNODEFICIENCY VIRUS [HIV] DISEASE: ICD-10-CM

## 2024-11-27 PROCEDURE — 94729 DIFFUSING CAPACITY: CPT

## 2024-11-27 PROCEDURE — 94726 PLETHYSMOGRAPHY LUNG VOLUMES: CPT

## 2024-11-27 PROCEDURE — ZZZZZ: CPT

## 2024-11-27 PROCEDURE — 99243 OFF/OP CNSLTJ NEW/EST LOW 30: CPT

## 2024-11-27 PROCEDURE — 99244 OFF/OP CNSLTJ NEW/EST MOD 40: CPT | Mod: 25

## 2024-11-27 PROCEDURE — 94010 BREATHING CAPACITY TEST: CPT

## 2024-12-05 ENCOUNTER — NON-APPOINTMENT (OUTPATIENT)
Age: 59
End: 2024-12-05

## 2024-12-10 ENCOUNTER — APPOINTMENT (OUTPATIENT)
Dept: RHEUMATOLOGY | Facility: CLINIC | Age: 59
End: 2024-12-10
Payer: COMMERCIAL

## 2024-12-10 VITALS
DIASTOLIC BLOOD PRESSURE: 83 MMHG | WEIGHT: 193 LBS | BODY MASS INDEX: 34.2 KG/M2 | SYSTOLIC BLOOD PRESSURE: 161 MMHG | HEART RATE: 94 BPM | OXYGEN SATURATION: 97 % | HEIGHT: 63 IN

## 2024-12-10 DIAGNOSIS — J84.9 INTERSTITIAL PULMONARY DISEASE, UNSPECIFIED: ICD-10-CM

## 2024-12-10 DIAGNOSIS — Z79.899 OTHER LONG TERM (CURRENT) DRUG THERAPY: ICD-10-CM

## 2024-12-10 DIAGNOSIS — M05.80 OTHER RHEUMATOID ARTHRITIS WITH RHEUMATOID FACTOR OF UNSPECIFIED SITE: ICD-10-CM

## 2024-12-10 PROCEDURE — 96372 THER/PROPH/DIAG INJ SC/IM: CPT

## 2024-12-10 PROCEDURE — G2211 COMPLEX E/M VISIT ADD ON: CPT

## 2024-12-10 PROCEDURE — 99215 OFFICE O/P EST HI 40 MIN: CPT | Mod: GC,25

## 2024-12-10 RX ADMIN — ABATACEPT 0 MG/ML: 125 INJECTION, SOLUTION SUBCUTANEOUS at 00:00

## 2024-12-11 ENCOUNTER — NON-APPOINTMENT (OUTPATIENT)
Age: 59
End: 2024-12-11

## 2024-12-11 RX ORDER — ABATACEPT 125 MG/ML
125 INJECTION, SOLUTION SUBCUTANEOUS
Qty: 0 | Refills: 0 | Status: COMPLETED | OUTPATIENT
Start: 2024-12-10

## 2024-12-17 ENCOUNTER — APPOINTMENT (OUTPATIENT)
Dept: RHEUMATOLOGY | Facility: CLINIC | Age: 59
End: 2024-12-17
Payer: COMMERCIAL

## 2024-12-17 VITALS
OXYGEN SATURATION: 96 % | TEMPERATURE: 97.9 F | SYSTOLIC BLOOD PRESSURE: 145 MMHG | DIASTOLIC BLOOD PRESSURE: 84 MMHG | RESPIRATION RATE: 16 BRPM | HEART RATE: 90 BPM

## 2024-12-17 PROCEDURE — 96372 THER/PROPH/DIAG INJ SC/IM: CPT

## 2024-12-17 RX ADMIN — ABATACEPT 0 MG/ML: 125 INJECTION, SOLUTION SUBCUTANEOUS at 00:00

## 2024-12-18 RX ORDER — ABATACEPT 125 MG/ML
125 INJECTION, SOLUTION SUBCUTANEOUS
Qty: 0 | Refills: 0 | Status: COMPLETED | OUTPATIENT
Start: 2024-12-17

## 2025-01-22 ENCOUNTER — RX RENEWAL (OUTPATIENT)
Age: 60
End: 2025-01-22

## 2025-01-28 ENCOUNTER — NON-APPOINTMENT (OUTPATIENT)
Age: 60
End: 2025-01-28

## 2025-01-28 ENCOUNTER — APPOINTMENT (OUTPATIENT)
Dept: RHEUMATOLOGY | Facility: CLINIC | Age: 60
End: 2025-01-28
Payer: COMMERCIAL

## 2025-01-28 VITALS
HEART RATE: 90 BPM | WEIGHT: 191.25 LBS | BODY MASS INDEX: 33.89 KG/M2 | DIASTOLIC BLOOD PRESSURE: 86 MMHG | HEIGHT: 63 IN | SYSTOLIC BLOOD PRESSURE: 131 MMHG | RESPIRATION RATE: 16 BRPM | OXYGEN SATURATION: 97 %

## 2025-01-28 DIAGNOSIS — M06.9 RHEUMATOID ARTHRITIS, UNSPECIFIED: ICD-10-CM

## 2025-01-28 DIAGNOSIS — Z79.899 OTHER LONG TERM (CURRENT) DRUG THERAPY: ICD-10-CM

## 2025-01-28 PROCEDURE — G2211 COMPLEX E/M VISIT ADD ON: CPT

## 2025-01-28 PROCEDURE — 99214 OFFICE O/P EST MOD 30 MIN: CPT

## 2025-01-29 LAB
ALBUMIN SERPL ELPH-MCNC: 4.4 G/DL
ALP BLD-CCNC: 74 U/L
ALT SERPL-CCNC: 21 U/L
ANION GAP SERPL CALC-SCNC: 12 MMOL/L
AST SERPL-CCNC: 25 U/L
BASOPHILS # BLD AUTO: 0.05 K/UL
BASOPHILS NFR BLD AUTO: 0.7 %
BILIRUB SERPL-MCNC: 0.5 MG/DL
BUN SERPL-MCNC: 13 MG/DL
CALCIUM SERPL-MCNC: 9.5 MG/DL
CHLORIDE SERPL-SCNC: 98 MMOL/L
CO2 SERPL-SCNC: 30 MMOL/L
CREAT SERPL-MCNC: 0.98 MG/DL
CRP SERPL-MCNC: <3 MG/L
EGFR: 90 ML/MIN/1.73M2
EOSINOPHIL # BLD AUTO: 0.29 K/UL
EOSINOPHIL NFR BLD AUTO: 4.2 %
ERYTHROCYTE [SEDIMENTATION RATE] IN BLOOD BY WESTERGREN METHOD: 51 MM/HR
HCT VFR BLD CALC: 44.3 %
HGB BLD-MCNC: 14.8 G/DL
IMM GRANULOCYTES NFR BLD AUTO: 0.3 %
LYMPHOCYTES # BLD AUTO: 2.88 K/UL
LYMPHOCYTES NFR BLD AUTO: 41.6 %
MAN DIFF?: NORMAL
MCHC RBC-ENTMCNC: 28.9 PG
MCHC RBC-ENTMCNC: 33.4 G/DL
MCV RBC AUTO: 86.5 FL
MONOCYTES # BLD AUTO: 0.35 K/UL
MONOCYTES NFR BLD AUTO: 5.1 %
NEUTROPHILS # BLD AUTO: 3.33 K/UL
NEUTROPHILS NFR BLD AUTO: 48.1 %
PLATELET # BLD AUTO: 191 K/UL
POTASSIUM SERPL-SCNC: 3.2 MMOL/L
PROT SERPL-MCNC: 7.7 G/DL
RBC # BLD: 5.12 M/UL
RBC # FLD: 14 %
SODIUM SERPL-SCNC: 140 MMOL/L
WBC # FLD AUTO: 6.92 K/UL

## 2025-02-19 ENCOUNTER — RX RENEWAL (OUTPATIENT)
Age: 60
End: 2025-02-19

## 2025-03-11 ENCOUNTER — APPOINTMENT (OUTPATIENT)
Dept: INFECTIOUS DISEASE | Facility: CLINIC | Age: 60
End: 2025-03-11
Payer: COMMERCIAL

## 2025-03-11 ENCOUNTER — NON-APPOINTMENT (OUTPATIENT)
Age: 60
End: 2025-03-11

## 2025-03-11 VITALS
HEIGHT: 63 IN | SYSTOLIC BLOOD PRESSURE: 143 MMHG | OXYGEN SATURATION: 97 % | BODY MASS INDEX: 35.08 KG/M2 | HEART RATE: 79 BPM | DIASTOLIC BLOOD PRESSURE: 82 MMHG | TEMPERATURE: 98.4 F | WEIGHT: 198 LBS

## 2025-03-11 DIAGNOSIS — Z92.89 PERSONAL HISTORY OF OTHER MEDICAL TREATMENT: ICD-10-CM

## 2025-03-11 DIAGNOSIS — Z01.00 ENCOUNTER FOR EXAMINATION OF EYES AND VISION W/OUT ABNORMAL FINDINGS: ICD-10-CM

## 2025-03-11 DIAGNOSIS — B20 HUMAN IMMUNODEFICIENCY VIRUS [HIV] DISEASE: ICD-10-CM

## 2025-03-11 PROCEDURE — 96156 HLTH BHV ASSMT/REASSESSMENT: CPT | Mod: 1L

## 2025-03-11 PROCEDURE — 99214 OFFICE O/P EST MOD 30 MIN: CPT

## 2025-03-12 LAB
25(OH)D3 SERPL-MCNC: 27.3 NG/ML
ALBUMIN SERPL ELPH-MCNC: 4.1 G/DL
ALP BLD-CCNC: 64 U/L
ALT SERPL-CCNC: 16 U/L
ANION GAP SERPL CALC-SCNC: 10 MMOL/L
APPEARANCE: CLEAR
AST SERPL-CCNC: 23 U/L
BACTERIA: NEGATIVE /HPF
BILIRUB SERPL-MCNC: 0.4 MG/DL
BILIRUBIN URINE: NEGATIVE
BLOOD URINE: NEGATIVE
BUN SERPL-MCNC: 15 MG/DL
CALCIUM SERPL-MCNC: 9.5 MG/DL
CAST: 0 /LPF
CD3 CELLS # BLD: 2094 CELLS/UL
CD3 CELLS NFR BLD: 75 %
CD3+CD4+ CELLS # BLD: 1661 CELLS/UL
CD3+CD4+ CELLS NFR BLD: 60 %
CD3+CD4+ CELLS/CD3+CD8+ CLL SPEC: 3.92 RATIO
CD3+CD8+ CELLS # SPEC: 423 CELLS/UL
CD3+CD8+ CELLS NFR BLD: 15 %
CHLORIDE SERPL-SCNC: 103 MMOL/L
CHOLEST SERPL-MCNC: 136 MG/DL
CO2 SERPL-SCNC: 27 MMOL/L
COLOR: YELLOW
CREAT SERPL-MCNC: 0.87 MG/DL
EGFRCR SERPLBLD CKD-EPI 2021: 101 ML/MIN/1.73M2
EPITHELIAL CELLS: 0 /HPF
ESTIMATED AVERAGE GLUCOSE: 128 MG/DL
GLUCOSE QUALITATIVE U: NEGATIVE MG/DL
GLUCOSE SERPL-MCNC: 97 MG/DL
HBA1C MFR BLD HPLC: 6.1 %
HCT VFR BLD CALC: 41.2 %
HDLC SERPL-MCNC: 41 MG/DL
HGB BLD-MCNC: 14 G/DL
KETONES URINE: NEGATIVE MG/DL
LDLC SERPL CALC-MCNC: 74 MG/DL
LEUKOCYTE ESTERASE URINE: NEGATIVE
MCHC RBC-ENTMCNC: 28.9 PG
MCHC RBC-ENTMCNC: 34 G/DL
MCV RBC AUTO: 85.1 FL
MICROSCOPIC-UA: NORMAL
NITRITE URINE: NEGATIVE
NONHDLC SERPL-MCNC: 95 MG/DL
PH URINE: 6
PLATELET # BLD AUTO: 176 K/UL
POTASSIUM SERPL-SCNC: 3.5 MMOL/L
PROT SERPL-MCNC: 7.2 G/DL
PROTEIN URINE: NEGATIVE MG/DL
PSA SERPL-MCNC: 1.43 NG/ML
RBC # BLD: 4.84 M/UL
RBC # FLD: 14.3 %
RED BLOOD CELLS URINE: 0 /HPF
SODIUM SERPL-SCNC: 140 MMOL/L
SPECIFIC GRAVITY URINE: 1.02
TRIGL SERPL-MCNC: 117 MG/DL
TSH SERPL-ACNC: 1.55 UIU/ML
UROBILINOGEN URINE: 0.2 MG/DL
WBC # FLD AUTO: 6.79 K/UL
WHITE BLOOD CELLS URINE: 0 /HPF

## 2025-03-13 LAB
HIV1 RNA # SERPL NAA+PROBE: ABNORMAL
HIV1 RNA # SERPL NAA+PROBE: ABNORMAL COPIES/ML
VIRAL LOAD INTERP: NORMAL
VIRAL LOAD LOG: ABNORMAL LG COP/ML

## 2025-03-25 ENCOUNTER — APPOINTMENT (OUTPATIENT)
Dept: CARDIOLOGY | Facility: CLINIC | Age: 60
End: 2025-03-25
Payer: COMMERCIAL

## 2025-03-25 ENCOUNTER — APPOINTMENT (OUTPATIENT)
Dept: RHEUMATOLOGY | Facility: CLINIC | Age: 60
End: 2025-03-25
Payer: COMMERCIAL

## 2025-03-25 ENCOUNTER — NON-APPOINTMENT (OUTPATIENT)
Age: 60
End: 2025-03-25

## 2025-03-25 VITALS
BODY MASS INDEX: 33.66 KG/M2 | DIASTOLIC BLOOD PRESSURE: 70 MMHG | OXYGEN SATURATION: 96 % | WEIGHT: 190 LBS | HEART RATE: 68 BPM | SYSTOLIC BLOOD PRESSURE: 120 MMHG

## 2025-03-25 VITALS
HEIGHT: 63 IN | DIASTOLIC BLOOD PRESSURE: 81 MMHG | RESPIRATION RATE: 16 BRPM | HEART RATE: 76 BPM | OXYGEN SATURATION: 97 % | WEIGHT: 190 LBS | SYSTOLIC BLOOD PRESSURE: 134 MMHG | BODY MASS INDEX: 33.66 KG/M2

## 2025-03-25 DIAGNOSIS — Z79.899 OTHER LONG TERM (CURRENT) DRUG THERAPY: ICD-10-CM

## 2025-03-25 DIAGNOSIS — J84.9 INTERSTITIAL PULMONARY DISEASE, UNSPECIFIED: ICD-10-CM

## 2025-03-25 DIAGNOSIS — M05.80 OTHER RHEUMATOID ARTHRITIS WITH RHEUMATOID FACTOR OF UNSPECIFIED SITE: ICD-10-CM

## 2025-03-25 DIAGNOSIS — R01.1 CARDIAC MURMUR, UNSPECIFIED: ICD-10-CM

## 2025-03-25 DIAGNOSIS — M06.9 RHEUMATOID ARTHRITIS, UNSPECIFIED: ICD-10-CM

## 2025-03-25 DIAGNOSIS — I10 ESSENTIAL (PRIMARY) HYPERTENSION: ICD-10-CM

## 2025-03-25 DIAGNOSIS — E78.5 HYPERLIPIDEMIA, UNSPECIFIED: ICD-10-CM

## 2025-03-25 PROCEDURE — G0537: CPT

## 2025-03-25 PROCEDURE — G2211 COMPLEX E/M VISIT ADD ON: CPT

## 2025-03-25 PROCEDURE — 99402 PREV MED CNSL INDIV APPRX 30: CPT

## 2025-03-25 PROCEDURE — 93000 ELECTROCARDIOGRAM COMPLETE: CPT

## 2025-03-25 PROCEDURE — 93306 TTE W/DOPPLER COMPLETE: CPT

## 2025-03-25 PROCEDURE — 99214 OFFICE O/P EST MOD 30 MIN: CPT | Mod: 25

## 2025-03-25 PROCEDURE — 99214 OFFICE O/P EST MOD 30 MIN: CPT | Mod: GC

## 2025-05-20 ENCOUNTER — RX RENEWAL (OUTPATIENT)
Age: 60
End: 2025-05-20

## 2025-06-10 ENCOUNTER — APPOINTMENT (OUTPATIENT)
Dept: RHEUMATOLOGY | Facility: CLINIC | Age: 60
End: 2025-06-10

## 2025-06-17 ENCOUNTER — APPOINTMENT (OUTPATIENT)
Dept: RHEUMATOLOGY | Facility: CLINIC | Age: 60
End: 2025-06-17
Payer: COMMERCIAL

## 2025-06-17 ENCOUNTER — RX RENEWAL (OUTPATIENT)
Age: 60
End: 2025-06-17

## 2025-06-17 VITALS
HEART RATE: 75 BPM | WEIGHT: 190 LBS | DIASTOLIC BLOOD PRESSURE: 88 MMHG | OXYGEN SATURATION: 98 % | HEIGHT: 63 IN | BODY MASS INDEX: 33.66 KG/M2 | RESPIRATION RATE: 16 BRPM | SYSTOLIC BLOOD PRESSURE: 142 MMHG

## 2025-06-17 LAB
ALBUMIN SERPL ELPH-MCNC: 4.4 G/DL
ALP BLD-CCNC: 63 U/L
ALT SERPL-CCNC: 28 U/L
ANION GAP SERPL CALC-SCNC: 14 MMOL/L
AST SERPL-CCNC: 31 U/L
BASOPHILS # BLD AUTO: 0.05 K/UL
BASOPHILS NFR BLD AUTO: 0.6 %
BILIRUB SERPL-MCNC: 0.4 MG/DL
BUN SERPL-MCNC: 13 MG/DL
CALCIUM SERPL-MCNC: 10.2 MG/DL
CHLORIDE SERPL-SCNC: 101 MMOL/L
CO2 SERPL-SCNC: 25 MMOL/L
CREAT SERPL-MCNC: 0.94 MG/DL
CRP SERPL-MCNC: <3 MG/L
EGFRCR SERPLBLD CKD-EPI 2021: 95 ML/MIN/1.73M2
EOSINOPHIL # BLD AUTO: 0.34 K/UL
EOSINOPHIL NFR BLD AUTO: 4.1 %
ERYTHROCYTE [SEDIMENTATION RATE] IN BLOOD BY WESTERGREN METHOD: 32 MM/HR
HCT VFR BLD CALC: 44.1 %
HGB BLD-MCNC: 15.1 G/DL
IMM GRANULOCYTES NFR BLD AUTO: 0.2 %
LYMPHOCYTES # BLD AUTO: 3.9 K/UL
LYMPHOCYTES NFR BLD AUTO: 47.6 %
MAN DIFF?: NORMAL
MCHC RBC-ENTMCNC: 29.8 PG
MCHC RBC-ENTMCNC: 34.2 G/DL
MCV RBC AUTO: 87.2 FL
MONOCYTES # BLD AUTO: 0.8 K/UL
MONOCYTES NFR BLD AUTO: 9.8 %
NEUTROPHILS # BLD AUTO: 3.09 K/UL
NEUTROPHILS NFR BLD AUTO: 37.7 %
PLATELET # BLD AUTO: 191 K/UL
POTASSIUM SERPL-SCNC: 3.7 MMOL/L
PROT SERPL-MCNC: 7.6 G/DL
RBC # BLD: 5.06 M/UL
RBC # FLD: 13.6 %
SODIUM SERPL-SCNC: 140 MMOL/L
WBC # FLD AUTO: 8.2 K/UL

## 2025-06-17 PROCEDURE — 99214 OFFICE O/P EST MOD 30 MIN: CPT

## 2025-06-17 PROCEDURE — G2211 COMPLEX E/M VISIT ADD ON: CPT

## 2025-06-19 LAB
HBV CORE IGG+IGM SER QL: NONREACTIVE
HBV SURFACE AG SER QL: NONREACTIVE
HCV AB SER QL: NONREACTIVE
HCV S/CO RATIO: 0.15 S/CO

## 2025-07-21 ENCOUNTER — RX RENEWAL (OUTPATIENT)
Age: 60
End: 2025-07-21

## 2025-07-22 ENCOUNTER — RX RENEWAL (OUTPATIENT)
Age: 60
End: 2025-07-22

## 2025-08-18 ENCOUNTER — NON-APPOINTMENT (OUTPATIENT)
Age: 60
End: 2025-08-18

## 2025-08-21 ENCOUNTER — RX RENEWAL (OUTPATIENT)
Age: 60
End: 2025-08-21

## 2025-09-16 ENCOUNTER — APPOINTMENT (OUTPATIENT)
Dept: RHEUMATOLOGY | Facility: CLINIC | Age: 60
End: 2025-09-16
Payer: COMMERCIAL

## 2025-09-16 VITALS
SYSTOLIC BLOOD PRESSURE: 138 MMHG | OXYGEN SATURATION: 98 % | HEIGHT: 63 IN | BODY MASS INDEX: 33.66 KG/M2 | RESPIRATION RATE: 16 BRPM | WEIGHT: 190 LBS | HEART RATE: 83 BPM | DIASTOLIC BLOOD PRESSURE: 81 MMHG

## 2025-09-16 DIAGNOSIS — J84.9 INTERSTITIAL PULMONARY DISEASE, UNSPECIFIED: ICD-10-CM

## 2025-09-16 DIAGNOSIS — M05.80 OTHER RHEUMATOID ARTHRITIS WITH RHEUMATOID FACTOR OF UNSPECIFIED SITE: ICD-10-CM

## 2025-09-16 DIAGNOSIS — Z79.899 OTHER LONG TERM (CURRENT) DRUG THERAPY: ICD-10-CM

## 2025-09-16 DIAGNOSIS — M72.2 PLANTAR FASCIAL FIBROMATOSIS: ICD-10-CM

## 2025-09-16 PROCEDURE — 99214 OFFICE O/P EST MOD 30 MIN: CPT

## 2025-09-16 PROCEDURE — G2211 COMPLEX E/M VISIT ADD ON: CPT

## 2025-09-17 LAB
ALBUMIN SERPL ELPH-MCNC: 4.6 G/DL
ALP BLD-CCNC: 66 U/L
ALT SERPL-CCNC: 25 U/L
ANION GAP SERPL CALC-SCNC: 15 MMOL/L
AST SERPL-CCNC: 30 U/L
BASOPHILS # BLD AUTO: 0.04 K/UL
BASOPHILS NFR BLD AUTO: 0.5 %
BILIRUB SERPL-MCNC: 0.6 MG/DL
BUN SERPL-MCNC: 12 MG/DL
CALCIUM SERPL-MCNC: 10 MG/DL
CHLORIDE SERPL-SCNC: 98 MMOL/L
CO2 SERPL-SCNC: 26 MMOL/L
CREAT SERPL-MCNC: 0.99 MG/DL
CRP SERPL-MCNC: <3 MG/L
EGFRCR SERPLBLD CKD-EPI 2021: 89 ML/MIN/1.73M2
EOSINOPHIL # BLD AUTO: 0.31 K/UL
EOSINOPHIL NFR BLD AUTO: 3.9 %
ERYTHROCYTE [SEDIMENTATION RATE] IN BLOOD BY WESTERGREN METHOD: 32 MM/HR
HCT VFR BLD CALC: 46.1 %
HGB BLD-MCNC: 15.7 G/DL
IMM GRANULOCYTES NFR BLD AUTO: 0.2 %
LYMPHOCYTES # BLD AUTO: 3.63 K/UL
LYMPHOCYTES NFR BLD AUTO: 45.1 %
MAN DIFF?: NORMAL
MCHC RBC-ENTMCNC: 30.1 PG
MCHC RBC-ENTMCNC: 34.1 G/DL
MCV RBC AUTO: 88.3 FL
MONOCYTES # BLD AUTO: 0.49 K/UL
MONOCYTES NFR BLD AUTO: 6.1 %
NEUTROPHILS # BLD AUTO: 3.56 K/UL
NEUTROPHILS NFR BLD AUTO: 44.2 %
PLATELET # BLD AUTO: 207 K/UL
POTASSIUM SERPL-SCNC: 3.7 MMOL/L
PROT SERPL-MCNC: 8.1 G/DL
RBC # BLD: 5.22 M/UL
RBC # FLD: 13.5 %
SODIUM SERPL-SCNC: 139 MMOL/L
WBC # FLD AUTO: 8.05 K/UL